# Patient Record
Sex: FEMALE | Race: WHITE | NOT HISPANIC OR LATINO | Employment: FULL TIME | URBAN - METROPOLITAN AREA
[De-identification: names, ages, dates, MRNs, and addresses within clinical notes are randomized per-mention and may not be internally consistent; named-entity substitution may affect disease eponyms.]

---

## 2017-03-01 ENCOUNTER — MYC MEDICAL ADVICE (OUTPATIENT)
Dept: FAMILY MEDICINE | Facility: CLINIC | Age: 32
End: 2017-03-01

## 2017-03-08 ENCOUNTER — OFFICE VISIT (OUTPATIENT)
Dept: FAMILY MEDICINE | Facility: CLINIC | Age: 32
End: 2017-03-08
Payer: COMMERCIAL

## 2017-03-08 VITALS
OXYGEN SATURATION: 99 % | DIASTOLIC BLOOD PRESSURE: 78 MMHG | HEART RATE: 70 BPM | WEIGHT: 192.6 LBS | HEIGHT: 67 IN | BODY MASS INDEX: 30.23 KG/M2 | TEMPERATURE: 98.5 F | SYSTOLIC BLOOD PRESSURE: 120 MMHG

## 2017-03-08 DIAGNOSIS — E73.9 LACTOSE INTOLERANCE: ICD-10-CM

## 2017-03-08 DIAGNOSIS — L70.9 ACNE, UNSPECIFIED ACNE TYPE: ICD-10-CM

## 2017-03-08 DIAGNOSIS — J34.89 SORE NOSE: ICD-10-CM

## 2017-03-08 DIAGNOSIS — R20.0 FINGER NUMBNESS: ICD-10-CM

## 2017-03-08 DIAGNOSIS — R19.5 LOOSE STOOLS: Primary | ICD-10-CM

## 2017-03-08 PROCEDURE — 99214 OFFICE O/P EST MOD 30 MIN: CPT | Performed by: FAMILY MEDICINE

## 2017-03-08 RX ORDER — ETHYNODIOL DIACETATE AND ETHINYL ESTRADIOL 1 MG-35MCG
1 KIT ORAL DAILY
Qty: 84 TABLET | Refills: 3 | Status: SHIPPED | OUTPATIENT
Start: 2017-03-08 | End: 2017-07-31

## 2017-03-08 NOTE — MR AVS SNAPSHOT
"              After Visit Summary   3/8/2017    Beatris Santo    MRN: [id number]           Patient Information     Date Of Birth          1985        Visit Information        Provider Department      3/8/2017 5:20 PM Ana Luisa Bartlett MD [facility]        Today's Diagnoses     Loose stools    -  1    Acne, unspecified acne type        Lactose intolerance        Finger numbness        Sore nose          Care Instructions    Eliminate lactose for at least 2 weeks and monitor symptoms. When you reintroduce lactose, start slowly with introducing hard cheeses and monitor for 3-4 days before reintroducing something else.     Continue using Differin.     Start taking over-the counter probiotic supplement.     Start using humidifier.     Use saline spray and then follow with saline gel, and then follow with antibiotic ointment at bed time.      Take 1 tablet of Kelnor daily.       Lactose Intolerance    Lactose is a simple sugar found in milk and dairy products. Lactose is found in dairy products such as milk, cheese, cottage cheese, cream, sour cream, ice cream, sherbet, milk solids, powdered milk, and whey.  Lactose is digested with the help of an enzyme the body makes called  lactase.\" People with lactose intolerance cannot digest dairy products because their bodies do not make enough lactase. Undigested lactose in the gut cannot be absorbed. This causes nausea, cramping, bloating, gas, diarrhea, and foul-smelling stools. These symptoms occur within 30 minutes to 2 hours after eating. Symptoms may be mild or severe.  Babies are born with the lactose enzyme, which allows them to absorb breast milk. However, lactose intolerance may appear in children as early as 2 to 5 years old. Even if you have been able to eat dairy products all your life, your body may lose the ability to make the lactose enzyme as you get older. Certain races such as Asians,  Americans, Hispanics, and " American Indians are prone to get this problem earlier in life.  Home care  The following guidelines will help you care for yourself at home:    Your body doesn t need dairy products to be healthy. So, if your symptoms are severe, the best solution is to stop eating dairy products.    If your symptoms are milder, you can probably do well consuming smaller amounts of dairy as long as you combine it with nondairy foods. Yogurt with live cultures may be okay to eat because it contains enzymes that digest lactose. Butter, margarine, hard and aged cheeses (cheddar, Swiss) contain less lactose and may be ok to eat. You will have to experiment to learn how much dairy you can tolerate without getting symptoms. It may help to keep a food diary.    There are many lactose-free dairy products including milk, ice cream, and cheeses that allow you to still enjoy dairy products. You may also take a lactase enzyme as a supplement that will help you digest dairy products.    We all need calcium and vitamin D in our diet for healthy bones. If you reduce or eliminate dairy from your diet, you need to replace it with other food sources that contain these nutrients such as kale, broccoli, white beans, green beans, lima beans, salmon, soy beans, tofu, or fortified juices. Or, you can take daily supplements.    Learn to read food labels and watch for prepared foods that are made with products that contain lactose (such as bread, cereal, lunch meats, salad dressings, cake and cookie mix, and coffee creamers).  Other products besides milk and milk products may contain lactose. They may be less obvious, and you need to check the labels carefully. These things may not bother you, but should be considered if you continue to have problems:    Bread and other baked goods    Waffles, pancakes, biscuits, cookies, and mixes to make them    Processed breakfast foods such as doughnuts, frozen waffles and pancakes, toaster pastries, and sweet  rolls    Processed breakfast cereals    Instant potatoes, soups, and breakfast drinks    Potato chips, corn chips, and other processed snacks    Processed meats like bermudez, sausage, hot dogs, and lunch meats    Margarine    Salad dressings    Liquid and powdered milk-based meal replacements    Protein powders and bars    Candies    Nondairy liquid and powdered coffee creamers    Nondairy whipped toppings  Follow-up care  Follow up with your doctor or as advised by our staff.  When to seek medical care  Get prompt medical attention if any of the following occur:    Increasing abdominal pain or swelling    Abdominal pain that moves to the right side of the lower abdomen    Fever of 100.4 F (38 C) or higher, or as directed by your health care provider    Repeated vomiting or diarrhea    0995-0617 [employer]. [address] [place] [address], [place] [address]. All rights reserved. This information is not intended as a substitute for professional medical care. Always follow your healthcare professional's instructions.        Tips for Lactose Intolerance  If you are lactose intolerant, you have a difficult time digesting lactose, a sugar found in milk and other dairy products. Many people are lactose intolerant. Undigested lactose won t hurt you, but it can cause unpleasant symptoms. The good news is that you can get relief. To help reduce symptoms, look for ways to limit the amount of lactose you eat. You can also take a lactase supplement before you eat dairy products.  Finding Your Limit    People with lactose intolerance may think they can t eat or drink any dairy products. This is often not true. Many people with lactose intolerance can eat or drink small amounts of dairy products without symptoms. To find your own limit, keep track of what you eat and drink. Write down when you have symptoms. Learn how much and what kinds of dairy products you can handle.  Tips to Reduce Symptoms    Choose low-lactose or  lactose-free dairy products.    Eat foods with active cultures, such as yogurt. Active cultures make lactose easier to digest.    Eat or drink dairy products with other foods to lessen symptoms.    Substitute fruit juice for some or all of the milk in recipes.    Take lactase enzyme tablets with dairy products to help prevent symptoms.    Avoid eating many high-lactose foods (such as milk, butter, and ice cream) at one time.  Eat Other Calcium-rich Foods  If you eat less dairy, you may be getting less calcium. Ask your doctor about calcium supplements. Also, eat more dairy-free, calcium-rich foods, such as:    Broccoli, lettuce greens, kale, bok gordon (Chinese cabbage), turnip greens    Fish with edible bones (canned salmon or sardines)    Alfalfa sprouts, soy sprouts    Tofu, soybeans, ngo beans, navy beans    Almonds, sesame seeds    Calcium-fortified orange juice, soy drink, rice drink    Oranges  Try Non-dairy Substitutes  Dairy Substitute   Milk, cream Soy drink, rice drink, nondairy creamer   Cheese Tofu (soy) cheese, some aged cheeses   Butter, margarine Milk-free margarine, vegetable oil   Ice cream Fruit sorbet, juice bars      Your body needs vitamin D to use calcium. You can get vitamin D by eating foods that contain vitamin D such as salmon, tuna, or eggs. Also, talk with your doctor about taking a vitamin D supplement.    1121-0512 [employer]. [address] [place] [address]. All rights reserved. This information is not intended as a substitute for professional medical care. Always follow your healthcare professional's instructions.              Follow-ups after your visit        Who to contact     If you have questions or need follow up information about today's clinic visit or your schedule please contact [facility] directly at [phone].  Normal or non-critical lab and imaging results will be communicated to you by MyChart, letter or phone within 4  "business days after the clinic has received the results. If you do not hear from us within 7 days, please contact the clinic through [employer] or phone. If you have a critical or abnormal lab result, we will notify you by phone as soon as possible.  Submit refill requests through [employer] or call your pharmacy and they will forward the refill request to us. Please allow 3 business days for your refill to be completed.          Additional Information About Your Visit        [employer] Information     [employer] gives you secure access to your electronic health record. If you see a primary care provider, you can also send messages to your care team and make appointments. If you have questions, please call your primary care clinic.  If you do not have a primary care provider, please call [phone] and they will assist you.        Care EveryWhere ID     This is your Care EveryWhere ID. This could be used by other organizations to access your [place] medical records  [id number]        Your Vitals Were     Pulse Temperature Height Last Period Pulse Oximetry BMI (Body Mass Index)    70 98.5  F (36.9  C) (Oral) 1.695 m (5' 6.73\") 02/25/2017 (Exact Date) 99% 30.41 kg/m2       Blood Pressure from Last 3 Encounters:   03/08/17 120/78   06/21/16 130/82    Weight from Last 3 Encounters:   03/08/17 87.4 kg (192 lb 9.6 oz)   06/21/16 79.7 kg (175 lb 11.2 oz)              Today, you had the following     No orders found for display         Today's Medication Changes          These changes are accurate as of: 3/8/17  6:22 PM.  If you have any questions, ask your nurse or doctor.               Start taking these medicines.        Dose/Directions    ethynodiol-ethinyl estradiol 1-35 MG-MCG per tablet   Commonly known as:  KELNOR   Used for:  Acne, unspecified acne type   Started by:  Ana Luisa Bartlett MD        Dose:  1 tablet   Take 1 tablet by mouth daily   Quantity:  84 tablet   Refills:  3            Where to get your " medicines      These medications were sent to [facility]/pharmacy #[id number] - [facility] [address], [place] AT [facility]  [address] [facility] [address] [place] [address]     Phone:  [phone]     ethynodiol-ethinyl estradiol 1-35 MG-MCG per tablet                Primary Care Provider Office Phone #    [facility] [phone]       No address on file        Thank you!     Thank you for choosing [facility]  for your care. Our goal is always to provide you with excellent care. Hearing back from our patients is one way we can continue to improve our services. Please take a few minutes to complete the written survey that you may receive in the mail after your visit with us. Thank you!             Your Updated Medication List - Protect others around you: Learn how to safely use, store and throw away your medicines at www.disposemymeds.org.          This list is accurate as of: 3/8/17  6:22 PM.  Always use your most recent med list.                   Brand Name Dispense Instructions for use    adapalene 0.1 % cream    DIFFERIN    45 g    Apply to clean face at bedtime       ethynodiol-ethinyl estradiol 1-35 MG-MCG per tablet    KELNOR    84 tablet    Take 1 tablet by mouth daily

## 2017-03-08 NOTE — PROGRESS NOTES
SUBJECTIVE:                                                    Beatris Santo is a 31 year old female who presents to clinic today for the following health issues:    Medication Followup of Adapalene    Taking Medication as prescribed: yes    Side Effects:  None    Medication Helping Symptoms:  NO- would like to discuss other options     ABDOMINAL PAIN     Onset:     Description:   Character: Cramping, bloating  Location: right lower quadrant left lower quadrant  Radiation: None    Intensity: mild    Progression of Symptoms:  same and intermittent    Accompanying Signs & Symptoms:  Fever/Chills?: no   Gas/Bloating: YES  Nausea: no   Vomitting: no   Diarrhea?: YES  Constipation:no   Dysuria or Hematuria: no    History:   Trauma: no   Previous similar pain: no    Previous tests done: none    Precipitating factors:   Does the pain change with:     Food: YES     BM: YES    Urination: no     Therapies Tried and outcome: lactaid- some relief     LMP:  2/25/2017     Concern: left pinky numbs and aches when typing for a long time. Cramping 1/10.        Problem list and histories reviewed & adjusted, as indicated.  Additional history: as documented      Acne: Beatris reports she does not believe Differin is working as good as she hoped. It has helped somewhat. She is using Cetaphil acne wash. She believes acne is largely hormonal and she usually breaks out around the time of her period and she would like to discuss OCP. Notes she picks at her acne. She is not currently sexually active.     Abdominal Pain: Beatris reports she is slightly lactose intolerant but is not sure to what extent. After she eats, at times, she is feeling gassy, bloated and has diarrhea. Stool is runny and loose. Dairy will worsen sx's but they are present even when she eats food that does not contain dairy (such as eating out for Chinese). She admits to sill eating a fair amt of cheese and yogart. She is drinking proteins containing whey  powder. Notes chinese food might be a trigger. Bowel movements are normal between episodes.  She is not waking up with diarrhea or abdominal pain. Sx's are not present at any specific time of the month. Denies nausea, fever, weight loss, anorexia, gerd    Finger pain: She reports her fifth finger will feel numb when she types too much at work, and it will feel disconnected from her hand. Denies elbow pain,joint pain. -she is not resting elbow on desk.    Additional Notes  -She notes that she went to [place] Nov. 2016 and she experienced epistaxis. Nasal dryness and epistaxis has persisted and she has developed scabs inside her nose. She has visited [facility] for this and is using an aloe saline spray/gel-pt is unsure of exact name.       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       Social History   Substance Use Topics     Smoking status: Never Smoker     Smokeless tobacco: Not on file     Alcohol use 0.0 oz/week      Comment: Socially     Family History   Problem Relation Age of Onset     DIABETES Mother      DIABETES Maternal Grandmother      Coronary Artery Disease Other      Great Grandparents (Maternal and Paternal)     Hypertension Maternal Grandmother      Hypertension Maternal Grandfather      Hypertension Paternal Grandmother      Hypertension Paternal Grandfather      Hyperlipidemia Father      Hyperlipidemia Other      Great Grandparents (Paternal)     Breast Cancer Maternal Grandmother      Colon Cancer Maternal Grandfather 86     Asthma Mother      Asthma Maternal Grandmother          Current Outpatient Prescriptions   Medication Sig Dispense Refill     adapalene (DIFFERIN) 0.1 % cream Apply to clean face at bedtime 45 g 11     Allergies   Allergen Reactions     Dairy Aid [Lactase]        Reviewed and updated as needed this visit by clinical staff      "  Reviewed and updated as needed this visit by Provider         ROS:  Constitutional, HEENT, cardiovascular, pulmonary, gi and gu systems are negative, except as otherwise noted.    OBJECTIVE:                                                    /78 (BP Location: Right arm, Patient Position: Chair, Cuff Size: Adult Regular)  Pulse 70  Temp 98.5  F (36.9  C) (Oral)  Ht 1.695 m (5' 6.73\")  Wt 87.4 kg (192 lb 9.6 oz)  LMP 02/25/2017 (Exact Date)  SpO2 99%  BMI 30.41 kg/m2  Body mass index is 30.41 kg/(m^2).  GENERAL: healthy, alert and no distress  HENT: nose and mouth without ulcers or lesions  ABDOMEN: soft, nontender, no hepatosplenomegaly, no masses and bowel sounds normal  MS: no gross musculoskeletal defects noted, no edema. Mild left ulnar groove tenderness-elbow.   PSYCH: mentation appears normal, affect normal/bright    Diagnostic Test Results:  Results for orders placed or performed in visit on 06/23/16   HIV Antigen Antibody Combo   Result Value Ref Range    HIV Antigen Antibody Combo  NR     Nonreactive   HIV-1 p24 Ag & HIV-1/HIV-2 Ab Not Detected     Hepatitis C antibody   Result Value Ref Range    Hepatitis C Antibody  NR     Nonreactive   Assay performance characteristics have not been established for newborns,   infants, and children     Anti Treponema   Result Value Ref Range    Treponema pallidum Antibody Negative NEG   Lipid panel reflex to direct LDL   Result Value Ref Range    Cholesterol 176 <200 mg/dL    Triglycerides 90 <150 mg/dL    HDL Cholesterol 49 (L) >49 mg/dL    LDL Cholesterol Calculated 109 (H) <100 mg/dL    Non HDL Cholesterol 127 <130 mg/dL   TSH with free T4 reflex   Result Value Ref Range    TSH 1.16 0.40 - 4.00 mU/L   Glucose   Result Value Ref Range    Glucose 82 70 - 99 mg/dL   IgA   Result Value Ref Range     70 - 380 mg/dL   Tissue transglutaminase antoinette IgA and IgG   Result Value Ref Range    Tissue Transglutaminase Antibody IgA <1  Negative   <7 U/mL    Tissue " "Transglutaminase Delia IgG 1 <7 U/mL   Ferritin   Result Value Ref Range    Ferritin 23 12 - 150 ng/mL          ASSESSMENT/PLAN:                                                      1. Loose stools  2. Lactose intolerance  Reviewed eliminating lactose for 2 weeks to see if sx's resolve. If sx's persistent with this will need further GI w/u and will give referral. However, if sx's resolve can slowly add back in lactose to determine which foods are most problematic.     3. Acne, unspecified acne type  Continue with Differin and will start Kelnor daily. Reviewed risks and benefits, typical use and advised pt that it might take a period of time before she start seeing benefit.   - ethynodiol-ethinyl estradiol (KELNOR) 1-35 MG-MCG per tablet; Take 1 tablet by mouth daily  Dispense: 84 tablet; Refill: 3    4. Finger numbness  Suspect due to ulnar neuropathy. Reviewed monitoring elbow positioning when sitting for mgmt. F/U if persistent.     5. Sore nose  Reviewed symptomatic mgmt including saline rinse, gel, abx ointment, and humidifier. F/ U PRN.        See Patient Instructions  Patient Instructions     Eliminate lactose for at least 2 weeks and monitor symptoms. When you reintroduce lactose, start slowly with introducing hard cheeses and monitor for 3-4 days before reintroducing something else.     Continue using Differin.     Start taking over-the counter probiotic supplement.     Start using humidifier.     Use saline spray and then follow with saline gel, and then follow with antibiotic ointment at bed time.      Take 1 tablet of Kelnor daily.       Lactose Intolerance    Lactose is a simple sugar found in milk and dairy products. Lactose is found in dairy products such as milk, cheese, cottage cheese, cream, sour cream, ice cream, sherbet, milk solids, powdered milk, and whey.  Lactose is digested with the help of an enzyme the body makes called  lactase.\" People with lactose intolerance cannot digest dairy products " because their bodies do not make enough lactase. Undigested lactose in the gut cannot be absorbed. This causes nausea, cramping, bloating, gas, diarrhea, and foul-smelling stools. These symptoms occur within 30 minutes to 2 hours after eating. Symptoms may be mild or severe.  Babies are born with the lactose enzyme, which allows them to absorb breast milk. However, lactose intolerance may appear in children as early as 2 to 5 years old. Even if you have been able to eat dairy products all your life, your body may lose the ability to make the lactose enzyme as you get older. Certain races such as Asians,  Americans, Hispanics, and American Indians are prone to get this problem earlier in life.  Home care  The following guidelines will help you care for yourself at home:    Your body doesn t need dairy products to be healthy. So, if your symptoms are severe, the best solution is to stop eating dairy products.    If your symptoms are milder, you can probably do well consuming smaller amounts of dairy as long as you combine it with nondairy foods. Yogurt with live cultures may be okay to eat because it contains enzymes that digest lactose. Butter, margarine, hard and aged cheeses (cheddar, Swiss) contain less lactose and may be ok to eat. You will have to experiment to learn how much dairy you can tolerate without getting symptoms. It may help to keep a food diary.    There are many lactose-free dairy products including milk, ice cream, and cheeses that allow you to still enjoy dairy products. You may also take a lactase enzyme as a supplement that will help you digest dairy products.    We all need calcium and vitamin D in our diet for healthy bones. If you reduce or eliminate dairy from your diet, you need to replace it with other food sources that contain these nutrients such as kale, broccoli, white beans, green beans, lima beans, salmon, soy beans, tofu, or fortified juices. Or, you can take daily  supplements.    Learn to read food labels and watch for prepared foods that are made with products that contain lactose (such as bread, cereal, lunch meats, salad dressings, cake and cookie mix, and coffee creamers).  Other products besides milk and milk products may contain lactose. They may be less obvious, and you need to check the labels carefully. These things may not bother you, but should be considered if you continue to have problems:    Bread and other baked goods    Waffles, pancakes, biscuits, cookies, and mixes to make them    Processed breakfast foods such as doughnuts, frozen waffles and pancakes, toaster pastries, and sweet rolls    Processed breakfast cereals    Instant potatoes, soups, and breakfast drinks    Potato chips, corn chips, and other processed snacks    Processed meats like bermudez, sausage, hot dogs, and lunch meats    Margarine    Salad dressings    Liquid and powdered milk-based meal replacements    Protein powders and bars    Candies    Nondairy liquid and powdered coffee creamers    Nondairy whipped toppings  Follow-up care  Follow up with your doctor or as advised by our staff.  When to seek medical care  Get prompt medical attention if any of the following occur:    Increasing abdominal pain or swelling    Abdominal pain that moves to the right side of the lower abdomen    Fever of 100.4 F (38 C) or higher, or as directed by your health care provider    Repeated vomiting or diarrhea    6044-9366 [employer]. [address] [place] [address]. All rights reserved. This information is not intended as a substitute for professional medical care. Always follow your healthcare professional's instructions.        Tips for Lactose Intolerance  If you are lactose intolerant, you have a difficult time digesting lactose, a sugar found in milk and other dairy products. Many people are lactose intolerant. Undigested lactose won t hurt you, but it can cause unpleasant  symptoms. The good news is that you can get relief. To help reduce symptoms, look for ways to limit the amount of lactose you eat. You can also take a lactase supplement before you eat dairy products.  Finding Your Limit    People with lactose intolerance may think they can t eat or drink any dairy products. This is often not true. Many people with lactose intolerance can eat or drink small amounts of dairy products without symptoms. To find your own limit, keep track of what you eat and drink. Write down when you have symptoms. Learn how much and what kinds of dairy products you can handle.  Tips to Reduce Symptoms    Choose low-lactose or lactose-free dairy products.    Eat foods with active cultures, such as yogurt. Active cultures make lactose easier to digest.    Eat or drink dairy products with other foods to lessen symptoms.    Substitute fruit juice for some or all of the milk in recipes.    Take lactase enzyme tablets with dairy products to help prevent symptoms.    Avoid eating many high-lactose foods (such as milk, butter, and ice cream) at one time.  Eat Other Calcium-rich Foods  If you eat less dairy, you may be getting less calcium. Ask your doctor about calcium supplements. Also, eat more dairy-free, calcium-rich foods, such as:    Broccoli, lettuce greens, kale, bok gordon (Chinese cabbage), turnip greens    Fish with edible bones (canned salmon or sardines)    Alfalfa sprouts, soy sprouts    Tofu, soybeans, ngo beans, navy beans    Almonds, sesame seeds    Calcium-fortified orange juice, soy drink, rice drink    Oranges  Try Non-dairy Substitutes  Dairy Substitute   Milk, cream Soy drink, rice drink, nondairy creamer   Cheese Tofu (soy) cheese, some aged cheeses   Butter, margarine Milk-free margarine, vegetable oil   Ice cream Fruit sorbet, juice bars      Your body needs vitamin D to use calcium. You can get vitamin D by eating foods that contain vitamin D such as salmon, tuna, or eggs. Also, talk  with your doctor about taking a vitamin D supplement.    6817-5959 The [employer]. [address] [place] [address], [place] [address]. All rights reserved. This information is not intended as a substitute for professional medical care. Always follow your healthcare professional's instructions.            This document serves as a record of the services and decisions personally performed and made by Ana Luisa Bartlett MD. It was created on her behalf by Mary García,a trained medical scribe. The creation of this document is based the provider's statements to the medical scribe.  Mary García March 8, 2017 6:28 PM     The information in this document, created by a scribe for me, accurately reflects the services I personally performed and the decisions made by me. I have reviewed and approved this document for accuracy.    MD Ana Luisa Cartwright MD  [facility]

## 2017-03-09 PROBLEM — E73.9 LACTOSE INTOLERANCE: Status: ACTIVE | Noted: 2017-03-09

## 2017-03-09 NOTE — PATIENT INSTRUCTIONS
"Eliminate lactose for at least 2 weeks and monitor symptoms. When you reintroduce lactose, start slowly with introducing hard cheeses and monitor for 3-4 days before reintroducing something else.     Continue using Differin.     Start taking over-the counter probiotic supplement.     Start using humidifier.     Use saline spray and then follow with saline gel, and then follow with antibiotic ointment at bed time.      Take 1 tablet of Kelnor daily.       Lactose Intolerance    Lactose is a simple sugar found in milk and dairy products. Lactose is found in dairy products such as milk, cheese, cottage cheese, cream, sour cream, ice cream, sherbet, milk solids, powdered milk, and whey.  Lactose is digested with the help of an enzyme the body makes called  lactase.\" People with lactose intolerance cannot digest dairy products because their bodies do not make enough lactase. Undigested lactose in the gut cannot be absorbed. This causes nausea, cramping, bloating, gas, diarrhea, and foul-smelling stools. These symptoms occur within 30 minutes to 2 hours after eating. Symptoms may be mild or severe.  Babies are born with the lactose enzyme, which allows them to absorb breast milk. However, lactose intolerance may appear in children as early as 2 to 5 years old. Even if you have been able to eat dairy products all your life, your body may lose the ability to make the lactose enzyme as you get older. Certain races such as Asians,  Americans, Hispanics, and American Indians are prone to get this problem earlier in life.  Home care  The following guidelines will help you care for yourself at home:    Your body doesn t need dairy products to be healthy. So, if your symptoms are severe, the best solution is to stop eating dairy products.    If your symptoms are milder, you can probably do well consuming smaller amounts of dairy as long as you combine it with nondairy foods. Yogurt with live cultures may be okay to eat " because it contains enzymes that digest lactose. Butter, margarine, hard and aged cheeses (cheddar, Swiss) contain less lactose and may be ok to eat. You will have to experiment to learn how much dairy you can tolerate without getting symptoms. It may help to keep a food diary.    There are many lactose-free dairy products including milk, ice cream, and cheeses that allow you to still enjoy dairy products. You may also take a lactase enzyme as a supplement that will help you digest dairy products.    We all need calcium and vitamin D in our diet for healthy bones. If you reduce or eliminate dairy from your diet, you need to replace it with other food sources that contain these nutrients such as kale, broccoli, white beans, green beans, lima beans, salmon, soy beans, tofu, or fortified juices. Or, you can take daily supplements.    Learn to read food labels and watch for prepared foods that are made with products that contain lactose (such as bread, cereal, lunch meats, salad dressings, cake and cookie mix, and coffee creamers).  Other products besides milk and milk products may contain lactose. They may be less obvious, and you need to check the labels carefully. These things may not bother you, but should be considered if you continue to have problems:    Bread and other baked goods    Waffles, pancakes, biscuits, cookies, and mixes to make them    Processed breakfast foods such as doughnuts, frozen waffles and pancakes, toaster pastries, and sweet rolls    Processed breakfast cereals    Instant potatoes, soups, and breakfast drinks    Potato chips, corn chips, and other processed snacks    Processed meats like bermudez, sausage, hot dogs, and lunch meats    Margarine    Salad dressings    Liquid and powdered milk-based meal replacements    Protein powders and bars    Candies    Nondairy liquid and powdered coffee creamers    Nondairy whipped toppings  Follow-up care  Follow up with your doctor or as advised by our  staff.  When to seek medical care  Get prompt medical attention if any of the following occur:    Increasing abdominal pain or swelling    Abdominal pain that moves to the right side of the lower abdomen    Fever of 100.4 F (38 C) or higher, or as directed by your health care provider    Repeated vomiting or diarrhea    6507-1045 [employer]. [address] [place] [address], [place] [address]. All rights reserved. This information is not intended as a substitute for professional medical care. Always follow your healthcare professional's instructions.        Tips for Lactose Intolerance  If you are lactose intolerant, you have a difficult time digesting lactose, a sugar found in milk and other dairy products. Many people are lactose intolerant. Undigested lactose won t hurt you, but it can cause unpleasant symptoms. The good news is that you can get relief. To help reduce symptoms, look for ways to limit the amount of lactose you eat. You can also take a lactase supplement before you eat dairy products.  Finding Your Limit    People with lactose intolerance may think they can t eat or drink any dairy products. This is often not true. Many people with lactose intolerance can eat or drink small amounts of dairy products without symptoms. To find your own limit, keep track of what you eat and drink. Write down when you have symptoms. Learn how much and what kinds of dairy products you can handle.  Tips to Reduce Symptoms    Choose low-lactose or lactose-free dairy products.    Eat foods with active cultures, such as yogurt. Active cultures make lactose easier to digest.    Eat or drink dairy products with other foods to lessen symptoms.    Substitute fruit juice for some or all of the milk in recipes.    Take lactase enzyme tablets with dairy products to help prevent symptoms.    Avoid eating many high-lactose foods (such as milk, butter, and ice cream) at one time.  Eat Other Calcium-rich Foods  If you eat less  dairy, you may be getting less calcium. Ask your doctor about calcium supplements. Also, eat more dairy-free, calcium-rich foods, such as:    Broccoli, lettuce greens, kale, bok gordon (Chinese cabbage), turnip greens    Fish with edible bones (canned salmon or sardines)    Alfalfa sprouts, soy sprouts    Tofu, soybeans, ngo beans, navy beans    Almonds, sesame seeds    Calcium-fortified orange juice, soy drink, rice drink    Oranges  Try Non-dairy Substitutes  Dairy Substitute   Milk, cream Soy drink, rice drink, nondairy creamer   Cheese Tofu (soy) cheese, some aged cheeses   Butter, margarine Milk-free margarine, vegetable oil   Ice cream Fruit sorbet, juice bars      Your body needs vitamin D to use calcium. You can get vitamin D by eating foods that contain vitamin D such as salmon, tuna, or eggs. Also, talk with your doctor about taking a vitamin D supplement.    0504-5595 [employer]. [address] [place] [address], [place] [address]. All rights reserved. This information is not intended as a substitute for professional medical care. Always follow your healthcare professional's instructions.

## 2017-07-31 ENCOUNTER — OFFICE VISIT (OUTPATIENT)
Dept: FAMILY MEDICINE | Facility: CLINIC | Age: 32
End: 2017-07-31
Payer: COMMERCIAL

## 2017-07-31 VITALS
WEIGHT: 198.2 LBS | HEART RATE: 62 BPM | RESPIRATION RATE: 16 BRPM | OXYGEN SATURATION: 99 % | SYSTOLIC BLOOD PRESSURE: 124 MMHG | TEMPERATURE: 98 F | BODY MASS INDEX: 31.11 KG/M2 | HEIGHT: 67 IN | DIASTOLIC BLOOD PRESSURE: 76 MMHG

## 2017-07-31 DIAGNOSIS — E73.9 LACTOSE INTOLERANCE: ICD-10-CM

## 2017-07-31 DIAGNOSIS — E66.9 OBESITY, UNSPECIFIED OBESITY SEVERITY, UNSPECIFIED OBESITY TYPE: ICD-10-CM

## 2017-07-31 DIAGNOSIS — L70.9 ACNE, UNSPECIFIED ACNE TYPE: ICD-10-CM

## 2017-07-31 DIAGNOSIS — Z00.00 ENCOUNTER FOR ROUTINE ADULT HEALTH EXAMINATION WITHOUT ABNORMAL FINDINGS: Primary | ICD-10-CM

## 2017-07-31 LAB
CHOLEST SERPL-MCNC: 222 MG/DL
GLUCOSE SERPL-MCNC: 93 MG/DL (ref 70–99)
HDLC SERPL-MCNC: 62 MG/DL
LDLC SERPL CALC-MCNC: 140 MG/DL
NONHDLC SERPL-MCNC: 160 MG/DL
TRIGL SERPL-MCNC: 99 MG/DL

## 2017-07-31 PROCEDURE — 36415 COLL VENOUS BLD VENIPUNCTURE: CPT | Performed by: FAMILY MEDICINE

## 2017-07-31 PROCEDURE — 84443 ASSAY THYROID STIM HORMONE: CPT | Performed by: FAMILY MEDICINE

## 2017-07-31 PROCEDURE — 80061 LIPID PANEL: CPT | Performed by: FAMILY MEDICINE

## 2017-07-31 PROCEDURE — 99395 PREV VISIT EST AGE 18-39: CPT | Performed by: FAMILY MEDICINE

## 2017-07-31 PROCEDURE — 82947 ASSAY GLUCOSE BLOOD QUANT: CPT | Performed by: FAMILY MEDICINE

## 2017-07-31 RX ORDER — DROSPIRENONE AND ETHINYL ESTRADIOL 0.03MG-3MG
1 KIT ORAL DAILY
Qty: 84 TABLET | Refills: 3 | Status: SHIPPED | OUTPATIENT
Start: 2017-07-31 | End: 2018-06-22

## 2017-07-31 ASSESSMENT — PAIN SCALES - GENERAL: PAINLEVEL: MILD PAIN (3)

## 2017-07-31 NOTE — NURSING NOTE
"Chief Complaint   Patient presents with     Physical     pt is fasting       Initial /76 (BP Location: Right arm, Patient Position: Right side, Cuff Size: Adult Regular)  Pulse 62  Temp 98  F (36.7  C) (Oral)  Resp 16  Ht 1.695 m (5' 6.75\")  Wt 89.9 kg (198 lb 3.2 oz)  LMP 06/20/2017 (Approximate)  SpO2 99%  BMI 31.28 kg/m2 Estimated body mass index is 31.28 kg/(m^2) as calculated from the following:    Height as of this encounter: 1.695 m (5' 6.75\").    Weight as of this encounter: 89.9 kg (198 lb 3.2 oz).  Medication Reconciliation: complete     Will Soledad LYNCH      "

## 2017-07-31 NOTE — MR AVS SNAPSHOT
After Visit Summary   7/31/2017    Beatris Santo    MRN: [id number]           Patient Information     Date Of Birth          1985        Visit Information        Provider Department      7/31/2017 7:00 AM Ana Luisa Bartlett MD [facility]        Today's Diagnoses     Encounter for routine adult health examination without abnormal findings    -  1    Acne, unspecified acne type        Lactose intolerance        Obesity, unspecified obesity severity, unspecified obesity type          Care Instructions    Finish this months pack of birth control pills then start with the new pack and skip placebo pills for that month.    Try using Aquaphor on the edge of your nose      Preventive Health Recommendations  Female Ages 26 - 39  Yearly exam:   See your health care provider every year in order to    Review health changes.     Discuss preventive care.      Review your medicines if you your doctor has prescribed any.    Until age 30: Get a Pap test every three years (more often if you have had an abnormal result).    After age 30: Talk to your doctor about whether you should have a Pap test every 3 years or have a Pap test with HPV screening every 5 years.   You do not need a Pap test if your uterus was removed (hysterectomy) and you have not had cancer.  You should be tested each year for STDs (sexually transmitted diseases), if you're at risk.   Talk to your provider about how often to have your cholesterol checked.  If you are at risk for diabetes, you should have a diabetes test (fasting glucose).  Shots: Get a flu shot each year. Get a tetanus shot every 10 years.   Nutrition:     Eat at least 5 servings of fruits and vegetables each day.    Eat whole-grain bread, whole-wheat pasta and brown rice instead of white grains and rice.    Talk to your provider about Calcium and Vitamin D.     Lifestyle    Exercise at least 150 minutes a week (30 minutes a day, 5 days of the  "week). This will help you control your weight and prevent disease.    Limit alcohol to one drink per day.    No smoking.     Wear sunscreen to prevent skin cancer.    See your dentist every six months for an exam and cleaning.            Follow-ups after your visit        Future tests that were ordered for you today     Open Future Orders        Priority Expected Expires Ordered    TSH with free T4 reflex Routine  1/31/2018 7/31/2017            Who to contact     If you have questions or need follow up information about today's clinic visit or your schedule please contact [facility] directly at [phone].  Normal or non-critical lab and imaging results will be communicated to you by Impervahart, letter or phone within 4 business days after the clinic has received the results. If you do not hear from us within 7 days, please contact the clinic through JRD Communicationt or phone. If you have a critical or abnormal lab result, we will notify you by phone as soon as possible.  Submit refill requests through Vupen or call your pharmacy and they will forward the refill request to us. Please allow 3 business days for your refill to be completed.          Additional Information About Your Visit        MyChart Information     Vupen gives you secure access to your electronic health record. If you see a primary care provider, you can also send messages to your care team and make appointments. If you have questions, please call your primary care clinic.  If you do not have a primary care provider, please call [phone] and they will assist you.        Care EveryWhere ID     This is your Care EveryWhere ID. This could be used by other organizations to access your [facility] medical records  [id number]        Your Vitals Were     Pulse Temperature Respirations Height Last Period Pulse Oximetry    62 98  F (36.7  C) (Oral) 16 1.695 m (5' 6.75\") 06/20/2017 (Approximate) 99%    BMI (Body Mass Index)                   " 31.28 kg/m2            Blood Pressure from Last 3 Encounters:   07/31/17 124/76   03/08/17 120/78   06/21/16 130/82    Weight from Last 3 Encounters:   07/31/17 89.9 kg (198 lb 3.2 oz)   03/08/17 87.4 kg (192 lb 9.6 oz)   06/21/16 79.7 kg (175 lb 11.2 oz)              We Performed the Following     Glucose     Lipid panel reflex to direct LDL          Today's Medication Changes          These changes are accurate as of: 7/31/17  7:39 AM.  If you have any questions, ask your nurse or doctor.               Start taking these medicines.        Dose/Directions    drospirenone-ethinyl estradiol 3-0.03 MG per tablet   Commonly known as:  MAYTE   Used for:  Acne, unspecified acne type   Started by:  Ana Luisa Bartlett MD        Dose:  1 tablet   Take 1 tablet by mouth daily   Quantity:  84 tablet   Refills:  3         Stop taking these medicines if you haven't already. Please contact your care team if you have questions.     ethynodiol-ethinyl estradiol 1-35 MG-MCG per tablet   Commonly known as:  KELNOR   Stopped by:  Ana Luisa Bartlett MD                Where to get your medicines      These medications were sent to [facility]/pharmacy #[id number] - [facility] [address] AT [place] [id number]     Phone:  [phone]     drospirenone-ethinyl estradiol 3-0.03 MG per tablet                Primary Care Provider Office Phone # Fax #    Ana Luisa Bartlett [phone]       [facility] [address] [address]        Equal Access to Services     SHREE MICHELLE AH: Dacia Singh, vance badillo, eladio kaalmagisselle dean, margarita mike. So [facility] [phone].    ATENCIÓN: Si habla español, tiene a romero disposición servicios gratuitos de asistencia lingüística. Llame al [phone].    We comply with applicable federal civil rights laws and [place] laws. We  do not discriminate on the basis of race, color, national origin, age, disability sex, sexual orientation or gender identity.            Thank you!     Thank you for choosing [facility]  for your care. Our goal is always to provide you with excellent care. Hearing back from our patients is one way we can continue to improve our services. Please take a few minutes to complete the written survey that you may receive in the mail after your visit with us. Thank you!             Your Updated Medication List - Protect others around you: Learn how to safely use, store and throw away your medicines at www.disposemymeds.org.          This list is accurate as of: 7/31/17  7:39 AM.  Always use your most recent med list.                   Brand Name Dispense Instructions for use Diagnosis    adapalene 0.1 % cream    DIFFERIN    45 g    Apply to clean face at bedtime    Acne, unspecified acne type       drospirenone-ethinyl estradiol 3-0.03 MG per tablet    MAYTE    84 tablet    Take 1 tablet by mouth daily    Acne, unspecified acne type

## 2017-07-31 NOTE — PROGRESS NOTES
"Answers for HPI/ROS submitted by the patient on 7/30/2017   Annual Exam:  Getting at least 3 servings of Calcium per day:: NO  Bi-annual eye exam:: Yes  Dental care twice a year:: Yes  Sleep apnea or symptoms of sleep apnea:: None  Diet:: Regular (no restrictions)  Frequency of exercise:: 2-3 days/week  Taking medications regularly:: Yes  Medication side effects:: Not applicable  Additional concerns today:: No  PHQ-2 Score: 0  Duration of exercise:: 15-30 minutes    Today's PHQ-2 Score:   PHQ-2 ( 1999 [employer]) 7/31/2017 7/30/2017   Q1: Little interest or pleasure in doing things 0 0   Q2: Feeling down, depressed or hopeless 0 0   PHQ-2 Score 0 0   Q1: Little interest or pleasure in doing things - Not at all   Q2: Feeling down, depressed or hopeless - Not at all   PHQ-2 Score - 0       Past/recent records reviewed and discussed for --  Reviewed Family, surgical, and past medical Hx.  Discussed current medications and reviewed health maintenance.   Pt is not interested in STD screening and has not had any new partners     BC -- Birth control is only okay; she doesn't think it is helping with acne. She is still getting large acne breakouts before her period and feels as though she has gained weight. She started the pill in March- only for acne, not for birth control. This is the first pill she has tried.  Acne flares are during the third week- one week prior to period.     Weight -- Pt says she has been very busy and hasn't had time to work out. There is a program at her work focusing on healthy diet and exercise called \"lose it to win it\" that she will be starting this month.   Wt Readings from Last 5 Encounters:   07/31/17 89.9 kg (198 lb 3.2 oz)   03/08/17 87.4 kg (192 lb 9.6 oz)   06/21/16 79.7 kg (175 lb 11.2 oz)     Loose stools/Lactose intolerance -- She is keeping lact-aid pills close by and has been trying to avoid lactose in foods as this made big difference for her. . No current concerns.         Abuse: " Current or Past(Physical, Sexual or Emotional)- No  Do you feel safe in your environment - Yes  Social History   Substance Use Topics     Smoking status: Never Smoker     Smokeless tobacco: Never Used     Alcohol use 0.0 oz/week      Comment: Socially - 0.5 per week     The patient does not drink >3 drinks per day nor >7 drinks per week.    Reviewed orders with patient.  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       Social History   Substance Use Topics     Smoking status: Never Smoker     Smokeless tobacco: Never Used     Alcohol use 0.0 oz/week      Comment: Socially - 0.5 per week     Family History   Problem Relation Age of Onset     DIABETES Mother      Asthma Mother      Hyperlipidemia Father      DIABETES Maternal Grandmother      Hypertension Maternal Grandmother      Breast Cancer Maternal Grandmother      Asthma Maternal Grandmother      Hypertension Maternal Grandfather      Colon Cancer Maternal Grandfather 86     Hypertension Paternal Grandmother      Hypertension Paternal Grandfather      Coronary Artery Disease Other      Great Grandparents (Maternal and Paternal)     Hyperlipidemia Other      Great Grandparents (Paternal)               Mammogram not appropriate for this patient based on age.    Pertinent mammograms are reviewed under the imaging tab.  History of abnormal Pap smear: NO - age 30- 65 PAP every 3 years  "recommended    Reviewed and updated as needed this visit by clinical staffTobacco  Allergies  Meds  Med Hx  Surg Hx  Fam Hx  Soc Hx        Reviewed and updated as needed this visit by Provider            ROS:   ROS: 10 point ROS neg other than the symptoms noted above in the HPI.    This document serves as a record of the services and decisions personally performed and made by Ana Luisa Bartlett MD. It was created on her behalf by Sherron Castro, a trained medical scribe. The creation of this document is based the provider's statements to the medical scribe.  Sherron Castro July 31, 2017 7:16 AM      OBJECTIVE:   /76 (BP Location: Right arm, Patient Position: Right side, Cuff Size: Adult Regular)  Pulse 62  Temp 98  F (36.7  C) (Oral)  Resp 16  Ht 1.695 m (5' 6.75\")  Wt 89.9 kg (198 lb 3.2 oz)  LMP 06/20/2017 (Approximate)  SpO2 99%  BMI 31.28 kg/m2  EXAM:  GENERAL: healthy, alert and no distress, obese  EYES: Eyes grossly normal to inspection, PERRL and conjunctivae and sclerae normal  HENT: ear canals and TM's normal, nose and mouth without ulcers or lesions, nose with dry, cracking skin  NECK: no adenopathy, no asymmetry, masses, or scars and thyroid normal to palpation  RESP: lungs clear to auscultation - no rales, rhonchi or wheezes  BREAST: normal without masses, tenderness or nipple discharge and no palpable axillary masses or adenopathy  CV: regular rate and rhythm, normal S1 S2, no S3 or S4, no murmur, click or rub, no peripheral edema and peripheral pulses strong  ABDOMEN: soft, nontender, no hepatosplenomegaly, no masses and bowel sounds normal   (female): normal female external genitalia, normal urethral meatus, vaginal mucosa pink, moist, well rugated, and normal cervix/adnexa/uterus without masses, moderate amount of discharge  MS: no gross musculoskeletal defects noted, no edema  SKIN: no suspicious lesions or rashes  NEURO: Normal strength and tone, mentation intact and " "speech normal  PSYCH: mentation appears normal, affect normal/bright    No results found for this or any previous visit (from the past 24 hour(s)).    ASSESSMENT/PLAN:   1. Encounter for routine adult health examination without abnormal findings  - Lipid panel reflex to direct LDL  - Glucose    2. Acne, unspecified acne type  Change ocp to mayte, consider skipping placebo pills to cycle Q 3-4 months also. ommon side effects of ocp's are reviewed as well as risks of blood clot and htn. Reviewed proper daily use and plan for missed pills. Reviewed back up contraception with condoms.   - drospirenone-ethinyl estradiol (MAYTE) 3-0.03 MG per tablet; Take 1 tablet by mouth daily  Dispense: 84 tablet; Refill: 3  - TSH with free T4 reflex; Future    3. Lactose intolerance  Improved stooling. Labs completed today.  - TSH with free T4 reflex; Future    4. Obesity, unspecified obesity severity, unspecified obesity type  Discussed healthy diet and exercise.   - TSH with free T4 reflex; Future    COUNSELING:   Reviewed preventive health counseling, as reflected in patient instructions       Regular exercise       Healthy diet/nutrition       Vision screening       Hearing screening       Contraception       Safe sex practices/STD prevention    BP Screening:   Last 3 BP Readings:    BP Readings from Last 3 Encounters:   07/31/17 124/76   03/08/17 120/78   06/21/16 130/82       The following was recommended to the patient:  Re-screen BP within a year and recommended lifestyle modifications     reports that she has never smoked. She has never used smokeless tobacco.    Estimated body mass index is 31.28 kg/(m^2) as calculated from the following:    Height as of this encounter: 1.695 m (5' 6.75\").    Weight as of this encounter: 89.9 kg (198 lb 3.2 oz).   Weight management plan: Discussed healthy diet and exercise guidelines and patient will follow up in 12 months in clinic to re-evaluate.    Counseling Resources:  ATP IV " Guidelines  Pooled Cohorts Equation Calculator  Breast Cancer Risk Calculator  FRAX Risk Assessment  ICSI Preventive Guidelines  Dietary Guidelines for Americans, 2010  Jump[employer]'s MyPlate  ASA Prophylaxis  Lung CA Screening    Patient Instructions   Finish this months pack of birth control pills then start with the new pack and skip placebo pills for that month.    Try using Aquaphor on the edge of your nose      Preventive Health Recommendations  Female Ages 26 - 39  Yearly exam:   See your health care provider every year in order to    Review health changes.     Discuss preventive care.      Review your medicines if you your doctor has prescribed any.    Until age 30: Get a Pap test every three years (more often if you have had an abnormal result).    After age 30: Talk to your doctor about whether you should have a Pap test every 3 years or have a Pap test with HPV screening every 5 years.   You do not need a Pap test if your uterus was removed (hysterectomy) and you have not had cancer.  You should be tested each year for STDs (sexually transmitted diseases), if you're at risk.   Talk to your provider about how often to have your cholesterol checked.  If you are at risk for diabetes, you should have a diabetes test (fasting glucose).  Shots: Get a flu shot each year. Get a tetanus shot every 10 years.   Nutrition:     Eat at least 5 servings of fruits and vegetables each day.    Eat whole-grain bread, whole-wheat pasta and brown rice instead of white grains and rice.    Talk to your provider about Calcium and Vitamin D.     Lifestyle    Exercise at least 150 minutes a week (30 minutes a day, 5 days of the week). This will help you control your weight and prevent disease.    Limit alcohol to one drink per day.    No smoking.     Wear sunscreen to prevent skin cancer.    See your dentist every six months for an exam and cleaning.        The information in this document, created by the medical scribe for me, accurately  reflects the services I personally performed and the decisions made by me. I have reviewed and approved this document for accuracy.   MD Ana Luisa Salgado MD  [facility]

## 2017-08-01 LAB — TSH SERPL DL<=0.005 MIU/L-ACNC: 1.68 MU/L (ref 0.4–4)

## 2017-10-19 ENCOUNTER — MYC MEDICAL ADVICE (OUTPATIENT)
Dept: FAMILY MEDICINE | Facility: CLINIC | Age: 32
End: 2017-10-19

## 2018-06-22 DIAGNOSIS — L70.9 ACNE, UNSPECIFIED ACNE TYPE: ICD-10-CM

## 2018-06-22 RX ORDER — DROSPIRENONE AND ETHINYL ESTRADIOL 0.03MG-3MG
KIT ORAL
Qty: 28 TABLET | Refills: 0 | Status: SHIPPED | OUTPATIENT
Start: 2018-06-22 | End: 2018-06-25

## 2018-06-22 NOTE — TELEPHONE ENCOUNTER
"Requested Prescriptions   Pending Prescriptions Disp Refills     drospirenone-ethinyl estradiol (MAYTE) 3-0.03 MG per tablet [Pharmacy Med Name: DROSPIRENONE-EE 3-0.03 MG TAB]  Last Written Prescription Date:  7/31/17  Last Fill Quantity: 84 tablet,  # refills: 3   Last office visit: 7/31/2017 with prescribing provider:  Dr. Bartlett   Future Office Visit:   Next 5 appointments (look out 90 days)     Aug 02, 2018  7:00 AM CDT   MyChart Physical Adult with Ana Luisa Bartlett MD   [facility] ([facility])    [address] [place] [id number] [phone]                84 tablet 3     Sig: TAKE 1 TABLET BY MOUTH DAILY    Contraceptives Protocol Passed    6/22/2018  1:19 AM       Passed - Patient is not a current smoker if age is 35 or older       Passed - Recent (12 mo) or future (30 days) visit within the authorizing provider's specialty    Patient had office visit in the last 12 months or has a visit in the next 30 days with authorizing provider or within the authorizing provider's specialty.  See \"Patient Info\" tab in inbasket, or \"Choose Columns\" in Meds & Orders section of the refill encounter.           Passed - No active pregnancy on record       Passed - No positive pregnancy test in past 12 months          "

## 2018-06-22 NOTE — TELEPHONE ENCOUNTER
Medication is being filled for 1 time refill only due to:  Due for office visit. Has appointment scheduled for 8/2/18. Needs to keep this appointment for further refills.     Pilar Dumont RN  [facility]

## 2018-06-25 ENCOUNTER — MYC MEDICAL ADVICE (OUTPATIENT)
Dept: FAMILY MEDICINE | Facility: CLINIC | Age: 33
End: 2018-06-25

## 2018-06-25 DIAGNOSIS — L70.9 ACNE, UNSPECIFIED ACNE TYPE: ICD-10-CM

## 2018-06-25 RX ORDER — DROSPIRENONE AND ETHINYL ESTRADIOL 0.03MG-3MG
1 KIT ORAL DAILY
Qty: 84 TABLET | Refills: 0 | Status: SHIPPED | OUTPATIENT
Start: 2018-06-25 | End: 2018-08-02

## 2018-06-25 NOTE — TELEPHONE ENCOUNTER
Ok for 90 day Rx  Does this need to go to mail order? A local pharmacy is listed. RN can send once clarified. Thanks!

## 2018-06-25 NOTE — TELEPHONE ENCOUNTER
Routing refill request to provider for review/approval because:  Patient is requesting a 90 day supply of medication. Per protocol, RN can only refill until 7/31/18 and patient is scheduled for physical on 8/2/2018.     Jalyn Fierro RN, BSN    Next 5 appointments (look out 90 days)     Aug 02, 2018  7:00 AM CDT   MyCbarrerat Physical Adult with Ana Luisa Bartlett MD   [facility] ([facility])    [address] [place] [id number] [phone]

## 2018-06-25 NOTE — TELEPHONE ENCOUNTER
"Requested Prescriptions   Pending Prescriptions Disp Refills     drospirenone-ethinyl estradiol (MAYTE) 3-0.03 MG per tablet  Last Written Prescription Date:  6/22/18  Last Fill Quantity: 28 tablet,  # refills: 0   Last office visit: 7/31/2017 with prescribing provider:  Dr. Bartlett   Future Office Visit:   Next 5 appointments (look out 90 days)     Aug 02, 2018  7:00 AM CDT   MyChart Physical Adult with Ana Luisa Bartlett MD   [facility]    [address] [place] [id number]   [phone]                28 tablet 0     Sig: Take 1 tablet by mouth daily    Contraceptives Protocol Passed    6/25/2018 10:58 AM       Passed - Patient is not a current smoker if age is 35 or older       Passed - Recent (12 mo) or future (30 days) visit within the authorizing provider's specialty    Patient had office visit in the last 12 months or has a visit in the next 30 days with authorizing provider or within the authorizing provider's specialty.  See \"Patient Info\" tab in inbasket, or \"Choose Columns\" in Meds & Orders section of the refill encounter.           Passed - No active pregnancy on record       Passed - No positive pregnancy test in past 12 months          "

## 2018-06-25 NOTE — TELEPHONE ENCOUNTER
Called and clarified with patient. Confirmed that local [facility] listed is where Rx needs to go. Per provider direction, writer signed Rx and patient will  from pharmacy when ready.     Medication Detail      Disp Refills Start End MARIANNE   drospirenone-ethinyl estradiol (MAYTE) 3-0.03 MG per tablet 84 tablet 0 6/25/2018  No   Sig - Route: Take 1 tablet by mouth daily - Oral   Class: E-Prescribe   Order: [id number]   E-Prescribing Status: Receipt confirmed by pharmacy (6/25/2018  3:35 PM CDT)   Printout Tracking   External Result Report   Pharmacy   [id number] - [place], [place] - [address] AT [facility]         Closing encounter.     My Rivera RN

## 2018-08-02 ENCOUNTER — OFFICE VISIT (OUTPATIENT)
Dept: FAMILY MEDICINE | Facility: CLINIC | Age: 33
End: 2018-08-02
Payer: COMMERCIAL

## 2018-08-02 VITALS
WEIGHT: 200 LBS | OXYGEN SATURATION: 100 % | RESPIRATION RATE: 16 BRPM | HEIGHT: 67 IN | HEART RATE: 71 BPM | SYSTOLIC BLOOD PRESSURE: 110 MMHG | DIASTOLIC BLOOD PRESSURE: 62 MMHG | BODY MASS INDEX: 31.39 KG/M2 | TEMPERATURE: 98.5 F

## 2018-08-02 DIAGNOSIS — Z00.00 ENCOUNTER FOR ROUTINE ADULT HEALTH EXAMINATION WITHOUT ABNORMAL FINDINGS: Primary | ICD-10-CM

## 2018-08-02 DIAGNOSIS — L70.9 ACNE, UNSPECIFIED ACNE TYPE: ICD-10-CM

## 2018-08-02 DIAGNOSIS — R25.2 LEG CRAMPS: ICD-10-CM

## 2018-08-02 DIAGNOSIS — R53.83 FATIGUE, UNSPECIFIED TYPE: ICD-10-CM

## 2018-08-02 LAB
ANION GAP SERPL CALCULATED.3IONS-SCNC: 11 MMOL/L (ref 3–14)
BASOPHILS # BLD AUTO: 0 10E9/L (ref 0–0.2)
BASOPHILS NFR BLD AUTO: 0.4 %
BUN SERPL-MCNC: 11 MG/DL (ref 7–30)
CALCIUM SERPL-MCNC: 8.9 MG/DL (ref 8.5–10.1)
CHLORIDE SERPL-SCNC: 103 MMOL/L (ref 94–109)
CHOLEST SERPL-MCNC: 193 MG/DL
CO2 SERPL-SCNC: 23 MMOL/L (ref 20–32)
CREAT SERPL-MCNC: 0.75 MG/DL (ref 0.52–1.04)
DIFFERENTIAL METHOD BLD: NORMAL
EOSINOPHIL # BLD AUTO: 0.2 10E9/L (ref 0–0.7)
EOSINOPHIL NFR BLD AUTO: 2.6 %
ERYTHROCYTE [DISTWIDTH] IN BLOOD BY AUTOMATED COUNT: 12.1 % (ref 10–15)
FERRITIN SERPL-MCNC: 60 NG/ML (ref 12–150)
GFR SERPL CREATININE-BSD FRML MDRD: 89 ML/MIN/1.7M2
GLUCOSE SERPL-MCNC: 90 MG/DL (ref 70–99)
HCT VFR BLD AUTO: 37.3 % (ref 35–47)
HDLC SERPL-MCNC: 63 MG/DL
HGB BLD-MCNC: 12.5 G/DL (ref 11.7–15.7)
LDLC SERPL CALC-MCNC: 81 MG/DL
LYMPHOCYTES # BLD AUTO: 2.4 10E9/L (ref 0.8–5.3)
LYMPHOCYTES NFR BLD AUTO: 32.4 %
MCH RBC QN AUTO: 29.6 PG (ref 26.5–33)
MCHC RBC AUTO-ENTMCNC: 33.5 G/DL (ref 31.5–36.5)
MCV RBC AUTO: 88 FL (ref 78–100)
MONOCYTES # BLD AUTO: 0.5 10E9/L (ref 0–1.3)
MONOCYTES NFR BLD AUTO: 6.4 %
NEUTROPHILS # BLD AUTO: 4.3 10E9/L (ref 1.6–8.3)
NEUTROPHILS NFR BLD AUTO: 58.2 %
NONHDLC SERPL-MCNC: 130 MG/DL
PLATELET # BLD AUTO: 284 10E9/L (ref 150–450)
POTASSIUM SERPL-SCNC: 4.1 MMOL/L (ref 3.4–5.3)
RBC # BLD AUTO: 4.23 10E12/L (ref 3.8–5.2)
SODIUM SERPL-SCNC: 137 MMOL/L (ref 133–144)
TRIGL SERPL-MCNC: 245 MG/DL
TSH SERPL DL<=0.005 MIU/L-ACNC: 2.57 MU/L (ref 0.4–4)
WBC # BLD AUTO: 7.4 10E9/L (ref 4–11)

## 2018-08-02 PROCEDURE — 99395 PREV VISIT EST AGE 18-39: CPT | Performed by: FAMILY MEDICINE

## 2018-08-02 PROCEDURE — 80048 BASIC METABOLIC PNL TOTAL CA: CPT | Performed by: FAMILY MEDICINE

## 2018-08-02 PROCEDURE — 85025 COMPLETE CBC W/AUTO DIFF WBC: CPT | Performed by: FAMILY MEDICINE

## 2018-08-02 PROCEDURE — 82728 ASSAY OF FERRITIN: CPT | Performed by: FAMILY MEDICINE

## 2018-08-02 PROCEDURE — 84443 ASSAY THYROID STIM HORMONE: CPT | Performed by: FAMILY MEDICINE

## 2018-08-02 PROCEDURE — 80061 LIPID PANEL: CPT | Performed by: FAMILY MEDICINE

## 2018-08-02 PROCEDURE — 36415 COLL VENOUS BLD VENIPUNCTURE: CPT | Performed by: FAMILY MEDICINE

## 2018-08-02 RX ORDER — DROSPIRENONE AND ETHINYL ESTRADIOL 0.03MG-3MG
1 KIT ORAL DAILY
Qty: 84 TABLET | Refills: 3 | Status: SHIPPED | OUTPATIENT
Start: 2018-08-02 | End: 2019-07-11

## 2018-08-02 NOTE — MR AVS SNAPSHOT
After Visit Summary   8/2/2018    Beatris Santo    MRN: [id number]           Patient Information     Date Of Birth          1985        Visit Information        Provider Department      8/2/2018 7:00 AM Ana Luisa Bartlett MD [facility]        Today's Diagnoses     Encounter for routine adult health examination without abnormal findings    -  1    Acne, unspecified acne type        Fatigue, unspecified type        Leg cramps          Care Instructions      Preventive Health Recommendations  Female Ages 26 - 39  * Focus on eating healthy food choices and exercising regularly.     Yearly exam:   See your health care provider every year in order to    Review health changes.     Discuss preventive care.      Review your medicines if you your doctor has prescribed any.    Until age 30: Get a Pap test every three years (more often if you have had an abnormal result).    After age 30: Talk to your doctor about whether you should have a Pap test every 3 years or have a Pap test with HPV screening every 5 years.   You do not need a Pap test if your uterus was removed (hysterectomy) and you have not had cancer.  You should be tested each year for STDs (sexually transmitted diseases), if you're at risk.   Talk to your provider about how often to have your cholesterol checked.  If you are at risk for diabetes, you should have a diabetes test (fasting glucose).  Shots: Get a flu shot each year. Get a tetanus shot every 10 years.   Nutrition:     Eat at least 5 servings of fruits and vegetables each day.    Eat whole-grain bread, whole-wheat pasta and brown rice instead of white grains and rice.    Get adequate Calcium and Vitamin D.     Lifestyle    Exercise at least 150 minutes a week (30 minutes a day, 5 days of the week). This will help you control your weight and prevent disease.    Limit alcohol to one drink per day.    No smoking.     Wear sunscreen to prevent skin  "cancer.    See your dentist every six months for an exam and cleaning.            Follow-ups after your visit        Your next 10 appointments already scheduled     Nov 29, 2018  4:15 PM CST   New Visit with Missy Smith MD   [facility]    [address] [address] [place] [id number] [phone]              Who to contact     If you have questions or need follow up information about today's clinic visit or your schedule please contact [facility] directly at [phone].  Normal or non-critical lab and imaging results will be communicated to you by ClauseMatchhart, letter or phone within 4 business days after the clinic has received the results. If you do not hear from us within 7 days, please contact the clinic through ClauseMatchhart or phone. If you have a critical or abnormal lab result, we will notify you by phone as soon as possible.  Submit refill requests through Glad [employer] Have You or call your pharmacy and they will forward the refill request to us. Please allow 3 business days for your refill to be completed.          Additional Information About Your Visit        MyChart Information     [employer] You gives you secure access to your electronic health record. If you see a primary care provider, you can also send messages to your care team and make appointments. If you have questions, please call your primary care clinic.  If you do not have a primary care provider, please call [phone] and they will assist you.        Care EveryWhere ID     This is your Care EveryWhere ID. This could be used by other organizations to access your [place] medical records  [id number]        Your Vitals Were     Pulse Temperature Respirations Height Last Period Pulse Oximetry    71 98.5  F (36.9  C) (Oral) 16 1.702 m (5' 7\") 06/17/2018 (Approximate) 100%    BMI (Body Mass Index)                   31.32 kg/m2            Blood Pressure from Last 3 Encounters:   08/02/18 110/62 "   07/31/17 124/76   03/08/17 120/78    Weight from Last 3 Encounters:   08/02/18 90.7 kg (200 lb)   07/31/17 89.9 kg (198 lb 3.2 oz)   03/08/17 87.4 kg (192 lb 9.6 oz)              We Performed the Following     Basic metabolic panel     CBC with platelets differential     Ferritin     Lipid panel reflex to direct LDL Fasting     TSH with free T4 reflex          Today's Medication Changes          These changes are accurate as of 8/2/18  7:32 AM.  If you have any questions, ask your nurse or doctor.               Stop taking these medicines if you haven't already. Please contact your care team if you have questions.     adapalene 0.1 % cream   Commonly known as:  DIFFERIN   Stopped by:  Ana Luisa Bartlett MD                Where to get your medicines      These medications were sent to [facility]/pharmacy #[id number] - [facility] [address], [place] AT [facility]  [address] [place] [address], [facility] [address]     Phone:  [phone]     drospirenone-ethinyl estradiol 3-0.03 MG per tablet                Primary Care Provider Office Phone # Fax #    Ana Luisa Bartlett [phone] [address] [facility] [address]        Equal Access to Services     [facility] : Hadii laila emmanuel hadasho Soomaali, waaxda luqadaha, qaybta kaalmada adeegyada, margarita velasco . So [facility] [phone].    ATENCIÓN: Si habla español, tiene a romero disposición servicios gratuitos de asistencia lingüística. Llame al [phone].    We comply with applicable federal civil rights laws and [place] laws. We do not discriminate on the basis of race, color, national origin, age, disability, sex, sexual orientation, or gender identity.            Thank you!     Thank you for choosing [facility]  for your care. Our goal is always to provide you with excellent care. Hearing back from our patients is one way we can continue to improve our services.  Please take a few minutes to complete the written survey that you may receive in the mail after your visit with us. Thank you!             Your Updated Medication List - Protect others around you: Learn how to safely use, store and throw away your medicines at www.disposemymeds.org.          This list is accurate as of 8/2/18  7:32 AM.  Always use your most recent med list.                   Brand Name Dispense Instructions for use Diagnosis    drospirenone-ethinyl estradiol 3-0.03 MG per tablet    MAYTE    84 tablet    Take 1 tablet by mouth daily    Acne, unspecified acne type

## 2018-08-02 NOTE — PROGRESS NOTES
SUBJECTIVE:   CC: Beatris Santo is an 32 year old woman who presents for preventive health visit.     Healthy Habits:  Answers for HPI/ROS submitted by the patient on 8/1/2018   Annual Exam:  Getting at least 3 servings of Calcium per day:: NO  Bi-annual eye exam:: Yes  Dental care twice a year:: Yes  Sleep apnea or symptoms of sleep apnea:: Daytime drowsiness  Frequency of exercise:: 2-3 days/week  Taking medications regularly:: Yes  Medication side effects:: Not applicable  Additional concerns today:: No  PHQ-2 Score: 0  Duration of exercise:: Less than 15 minutes    Exercise  -She is walking at work.   -Patient used to wear a fitbit.     Dental  -She sees the Dentist regularly.     Birth Control  -She likes her current birth control. It is helping her acne. Her menstrual periods are regular, and she is not experiencing any abdominal cramping. She is not using adapalene anymore.   -She saturates a pad every 3-4 hours.   -She is not sexually active.  -Patient does not want STD testing.   -Last Pap Smear was negative on 06-.     Breasts  -Patient feels her breasts have enlarged.     GI  -Patient does not feel like she has IBS, but has symptoms. Has occasional days where she will eat something and have mild stool changes and feel off.   -She has a regular bowel movement daily.   -Patient denies abdominal pain.     Sleep  -She gets 6-7 hours of sleep. Patient has a harder time falling asleep.     Previous Illnesses/Concerns  -Patient reports that she got the Flu in January 2018.   -Concerned about iron levels as she has not taken Iron in a while. She has felt drowsy and has not eaten much red meat.   -Concerned about potassium.     Family Medical History  -Patient's maternal grandmother past away from congestive heart failure.     Today's PHQ-2 Score:   PHQ-2 ( 1999 Pfizer) 8/1/2018 7/31/2017   Q1: Little interest or pleasure in doing things 0 0   Q2: Feeling down, depressed or hopeless 0 0    PHQ-2 Score 0 0   Q1: Little interest or pleasure in doing things Not at all -   Q2: Feeling down, depressed or hopeless Not at all -   PHQ-2 Score 0 -       Abuse: Current or Past(Physical, Sexual or Emotional)- No  Do you feel safe in your environment - Yes    Social History   Substance Use Topics     Smoking status: Never Smoker     Smokeless tobacco: Never Used     Alcohol use 0.0 oz/week      Comment: Socially - 0.5 per week     If you drink alcohol do you typically have >3 drinks per day or >7 drinks per week? Occasionally                      Reviewed orders with patient.  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       Social History   Substance Use Topics     Smoking status: Never Smoker     Smokeless tobacco: Never Used     Alcohol use 0.0 oz/week      Comment: Socially - 0.5 per week     Family History   Problem Relation Age of Onset     Diabetes Mother      Asthma Mother      Hyperlipidemia Father      Diabetes Maternal Grandmother      Hypertension Maternal Grandmother      Breast Cancer Maternal Grandmother      Asthma Maternal Grandmother      Heart Failure Maternal Grandmother       in her 80's     Hypertension Maternal Grandfather      Colon Cancer Maternal Grandfather 86     Hypertension Paternal Grandmother      Hypertension Paternal Grandfather      Coronary Artery Disease Other      Great Grandparents (Maternal and Paternal)     Hyperlipidemia Other      Great Grandparents (Paternal)         Current Outpatient Prescriptions   Medication Sig Dispense Refill     drospirenone-ethinyl estradiol (MAYTE) 3-0.03 MG per tablet Take 1 tablet by mouth daily 84 tablet 3     [DISCONTINUED] drospirenone-ethinyl  "estradiol (MAYTE) 3-0.03 MG per tablet Take 1 tablet by mouth daily 84 tablet 0     Allergies   Allergen Reactions     Dairy Aid [Lactase]        Mammogram not appropriate for this patient based on age.    Pertinent mammograms are reviewed under the imaging tab.  History of abnormal Pap smear: NO - age 30- 65 PAP every 3 years recommended  PAP / HPV 6/21/2016   PAP NIL     Reviewed and updated as needed this visit by clinical staff  Tobacco  Allergies  Meds  Med Hx  Surg Hx  Fam Hx  Soc Hx        Reviewed and updated as needed this visit by Provider        ROS:  10 point ROS of systems including Constitutional, Eyes, Respiratory, Cardiovascular, Gastroenterology, Genitourinary, Integumentary, Muscularskeletal, Psychiatric were all negative except for pertinent positives noted in my HPI.    This document serves as a record of the services and decisions personally performed and made by Ana Luisa Bartlett MD. It was created on her behalf by Bonnie Keane, a trained medical scribe. The creation of this document is based on the provider's statements to the medical scribe.  Bonnie Keane 7:14 AM August 2, 2018    OBJECTIVE:   /62 (BP Location: Right arm, Patient Position: Sitting, Cuff Size: Adult Large)  Pulse 71  Temp 98.5  F (36.9  C) (Oral)  Resp 16  Ht 1.702 m (5' 7\")  Wt 90.7 kg (200 lb)  LMP 06/17/2018 (Approximate)  SpO2 100%  BMI 31.32 kg/m2   EXAM:  GENERAL: obese, alert and no distress  EYES: Eyes grossly normal to inspection, PERRL and conjunctivae and sclerae normal  HENT: ear canals and TM's normal, nose and mouth without ulcers or lesions  NECK: no adenopathy, no asymmetry, masses, or scars and thyroid normal to palpation  RESP: lungs clear to auscultation - no rales, rhonchi or wheezes  BREAST: normal without masses, tenderness or nipple discharge and no palpable axillary masses or adenopathy  CV: regular rate and rhythm, normal S1 S2, no S3 or S4, no murmur, click or rub, no " peripheral edema and peripheral pulses strong  ABDOMEN: soft, nontender, no hepatosplenomegaly, no masses and bowel sounds normal   (female): normal female external genitalia, normal urethral meatus, vaginal mucosa pink, moist, well rugated, and normal cervix/adnexa/uterus without masses or discharge  MS: no gross musculoskeletal defects noted, no edema  SKIN: no suspicious lesions or rashes  NEURO: Normal strength and tone, mentation intact and speech normal  PSYCH: mentation appears normal, affect normal/bright    Diagnostic Test Results:  No results found for this or any previous visit (from the past 24 hour(s)).    ASSESSMENT/PLAN:   1. Encounter for routine adult health examination without abnormal findings  Physical exam completed. Patient is doing well. Counseled Patient on weight management. Discussed setting a goal of 5lb weight loss in the next year. Encouraged Patient to exercise regularly. Advised Patient to keep track of her diet, bowel movements, appetite, any blood in stool, and persisting symptoms to watch for  GI care. Recommended the Flu shot. Order placed for labs that the patient will complete today.    Body mass index is 31.32 kg/(m^2).    - Lipid panel reflex to direct LDL Fasting    2. Acne, unspecified acne type  Controlled, continue current medication.   - drospirenone-ethinyl estradiol (MAYTE) 3-0.03 MG per tablet; Take 1 tablet by mouth daily  Dispense: 84 tablet; Refill: 3    3. Fatigue, unspecified type  Patient is concerned about her iron level as she has felt fatigued. Lab work ordered for further evaluation. Will notify Patient with results.   - CBC with platelets differential  - Ferritin  - TSH with free T4 reflex    4. Leg cramps  Patient has experienced leg cramps recently. Checking lab work for further evaluation, and will notify Patient with results.   - Basic metabolic panel      COUNSELING:   Reviewed preventive health counseling, as reflected in patient instructions        "Regular exercise       Healthy diet/nutrition       Contraception       STD screening       Sleep       Flu shot    BP Readings from Last 1 Encounters:   07/31/17 124/76     Estimated body mass index is 31.32 kg/(m^2) as calculated from the following:    Height as of this encounter: 1.702 m (5' 7\").    Weight as of this encounter: 90.7 kg (200 lb).      Weight management plan: Discussed healthy diet and exercise guidelines and patient will follow up in 12 months in clinic to re-evaluate.     reports that she has never smoked. She has never used smokeless tobacco.      Counseling Resources:  ATP IV Guidelines  Pooled Cohorts Equation Calculator  Breast Cancer Risk Calculator  FRAX Risk Assessment  ICSI Preventive Guidelines  Dietary Guidelines for Americans, 2010  USDA's MyPlate  ASA Prophylaxis  Lung CA Screening    Total time spent with patient is <30 min with over 50% counseling regarding above information    The information in this document, created by the medical scribe for me, accurately reflects the services I personally performed and the decisions made by me. I have reviewed and approved this document for accuracy prior to leaving the patient care area.  August 2, 2018 8:30 AM    Ana Luisa Bartlett MD  [facility]  "

## 2018-08-02 NOTE — PATIENT INSTRUCTIONS
Preventive Health Recommendations  Female Ages 26 - 39  * Focus on eating healthy food choices and exercising regularly.     Yearly exam:   See your health care provider every year in order to    Review health changes.     Discuss preventive care.      Review your medicines if you your doctor has prescribed any.    Until age 30: Get a Pap test every three years (more often if you have had an abnormal result).    After age 30: Talk to your doctor about whether you should have a Pap test every 3 years or have a Pap test with HPV screening every 5 years.   You do not need a Pap test if your uterus was removed (hysterectomy) and you have not had cancer.  You should be tested each year for STDs (sexually transmitted diseases), if you're at risk.   Talk to your provider about how often to have your cholesterol checked.  If you are at risk for diabetes, you should have a diabetes test (fasting glucose).  Shots: Get a flu shot each year. Get a tetanus shot every 10 years.   Nutrition:     Eat at least 5 servings of fruits and vegetables each day.    Eat whole-grain bread, whole-wheat pasta and brown rice instead of white grains and rice.    Get adequate Calcium and Vitamin D.     Lifestyle    Exercise at least 150 minutes a week (30 minutes a day, 5 days of the week). This will help you control your weight and prevent disease.    Limit alcohol to one drink per day.    No smoking.     Wear sunscreen to prevent skin cancer.    See your dentist every six months for an exam and cleaning.

## 2018-08-06 ENCOUNTER — MYC MEDICAL ADVICE (OUTPATIENT)
Dept: FAMILY MEDICINE | Facility: CLINIC | Age: 33
End: 2018-08-06

## 2018-08-06 NOTE — TELEPHONE ENCOUNTER
Form printed and signed.   Please fill in remaining options from cpe  Update patient when completed  Form placed in the fax slot

## 2018-08-17 ENCOUNTER — OFFICE VISIT (OUTPATIENT)
Dept: PEDIATRICS | Facility: CLINIC | Age: 33
End: 2018-08-17
Payer: COMMERCIAL

## 2018-08-17 VITALS
WEIGHT: 199.2 LBS | HEART RATE: 86 BPM | SYSTOLIC BLOOD PRESSURE: 121 MMHG | TEMPERATURE: 98.4 F | OXYGEN SATURATION: 98 % | BODY MASS INDEX: 31.2 KG/M2 | DIASTOLIC BLOOD PRESSURE: 70 MMHG

## 2018-08-17 DIAGNOSIS — J20.9 ACUTE BRONCHITIS, UNSPECIFIED ORGANISM: Primary | ICD-10-CM

## 2018-08-17 PROCEDURE — 99213 OFFICE O/P EST LOW 20 MIN: CPT | Performed by: NURSE PRACTITIONER

## 2018-08-17 RX ORDER — AZITHROMYCIN 250 MG/1
TABLET, FILM COATED ORAL
Qty: 6 TABLET | Refills: 0 | Status: SHIPPED | OUTPATIENT
Start: 2018-08-17 | End: 2019-08-05

## 2018-08-17 RX ORDER — ALBUTEROL SULFATE 90 UG/1
2 AEROSOL, METERED RESPIRATORY (INHALATION) EVERY 4 HOURS PRN
Qty: 1 INHALER | Refills: 1 | Status: SHIPPED | OUTPATIENT
Start: 2018-08-17 | End: 2019-08-05

## 2018-08-17 NOTE — PROGRESS NOTES
SUBJECTIVE:   Beatris Santo is a 32 year old female who presents to clinic today for the following health issues:    Acute Illness   Acute illness concerns: sore throat, coughing, SOB  Onset: 5 days    Fever: no    Chills/Sweats: YES    Headache (location?): YES    Sinus Pressure:no    Conjunctivitis:  no    Ear Pain: no    Rhinorrhea: YES    Congestion: no    Sore Throat: YES     Cough: YES-non-productive, with shortness of breath    Wheeze: YES    Decreased Appetite: no    Nausea: no    Vomiting: no    Diarrhea:  no    Dysuria/Freq.: no    Fatigue/Achiness: YES    Sick/Strep Exposure: no     Therapies Tried and outcome: tea, water    Started with mild URI symptoms but now having SOB and some wheezing   Problem list and histories reviewed & adjusted, as indicated.  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       Social History   Substance Use Topics     Smoking status: Never Smoker     Smokeless tobacco: Never Used     Alcohol use 0.0 oz/week      Comment: Socially     Family History   Problem Relation Age of Onset     Diabetes Mother      Asthma Mother      Hyperlipidemia Father      Diabetes Maternal Grandmother      Hypertension Maternal Grandmother      Breast Cancer Maternal Grandmother      Asthma Maternal Grandmother      Heart Failure Maternal Grandmother       in her 80's     Hypertension Maternal Grandfather      Colon Cancer Maternal Grandfather 86     Hypertension Paternal Grandmother      Hypertension Paternal Grandfather      Coronary Artery Disease Other      Great Grandparents (Maternal and Paternal)     Hyperlipidemia Other      Great Grandparents (Paternal)     Coronary Artery Disease Other      Great Grandparents (Maternal  and Paternal)     Hyperlipidemia Other      Great Grandparents (Paternal)         Current Outpatient Prescriptions   Medication Sig Dispense Refill     drospirenone-ethinyl estradiol (MAYTE) 3-0.03 MG per tablet Take 1 tablet by mouth daily 84 tablet 3     Allergies   Allergen Reactions     Dairy Aid [Lactase]      Labs reviewed in EPIC    Reviewed and updated as needed this visit by clinical staff  Tobacco  Allergies  Meds  Med Hx  Surg Hx  Fam Hx  Soc Hx      Reviewed and updated as needed this visit by Provider         ROS:  CONSTITUTIONAL:NEGATIVE for fever, chills, change in weight  ENT/MOUTH: POSITIVE for hoarse voice, nasal congestion, postnasal drainage and sore throat and NEGATIVE for ear pain bilateral, epistaxis and fever  RESP:POSITIVE for cough-productive, SOB/dyspnea and wheezing and NEGATIVE for Hx asthma  CV: NEGATIVE for chest pain/chest pressure  GI: NEGATIVE for nausea and vomiting  MUSCULOSKELETAL: NEGATIVE for significant arthralgias or myalgia  NEURO: NEGATIVE for weakness, dizziness or paresthesias  HEME/ALLERGY/IMMUNE: NEGATIVE for allergies    OBJECTIVE:     /70 (BP Location: Right arm, Patient Position: Sitting, Cuff Size: Adult Regular)  Pulse 86  Temp 98.4  F (36.9  C) (Temporal)  Wt 199 lb 3.2 oz (90.4 kg)  LMP 08/10/2018  SpO2 98%  Breastfeeding? No  BMI 31.2 kg/m2  Body mass index is 31.2 kg/(m^2).  GENERAL: Patient is well nourished, well developed,in no apparent distress, non-toxic, in no respiratory distress and acyanotic, alert, cooperative and well hydrated  mildly ill but alert and responsive  EYES:  Right conjunctiva is not injected and without discharge.  Left conjunctiva is not injected and without discharge.  EARS: negative findings: external ears normal to inspection and palpation, TM's clear, no mastoid process tenderness,   NOSE: negative findings: nasal mucosa normal, no sinus tenderness,  Sinus not tender.  THROAT: normal.  NECK: supple with no  adenopathy,   CARDIAC:NORMAL - regular rate and rhythm without murmur.  RESP: normal respiratory rate and rhythm, lungs clear to auscultation  barky cough  ABD: Abdomen soft, non-tender.  SKIN: Skin color, texture, turgor normal. No rashes or lesions.  MS: extremities normal- no gross deformities noted, gait normal and normal muscle tone      Diagnostic Test Results:  None     ASSESSMENT/PLAN:     Beatris was seen today for uri.    Diagnoses and all orders for this visit:    Acute bronchitis, unspecified organism  -     albuterol (PROAIR HFA/PROVENTIL HFA/VENTOLIN HFA) 108 (90 Base) MCG/ACT inhaler; Inhale 2 puffs into the lungs every 4 hours as needed for shortness of breath / dyspnea or wheezing  -     azithromycin (ZITHROMAX) 250 MG tablet; Take 2 tablets the first day and then take one a day for 4 days.  -     Spacer/Aero-Holding Chambers (SPACER/AERO-HOLD CHAMBER MASK) ROBERTO; 1 Device as needed    PLAN:   1.   Symptomatic therapy suggested: OTC Robitussin DM and call prn if symptoms persist or worsen.  2.  Orders Placed This Encounter   Medications     albuterol (PROAIR HFA/PROVENTIL HFA/VENTOLIN HFA) 108 (90 Base) MCG/ACT inhaler     Sig: Inhale 2 puffs into the lungs every 4 hours as needed for shortness of breath / dyspnea or wheezing     Dispense:  1 Inhaler     Refill:  1     azithromycin (ZITHROMAX) 250 MG tablet     Sig: Take 2 tablets the first day and then take one a day for 4 days.     Dispense:  6 tablet     Refill:  0     Spacer/Aero-Holding Chambers (SPACER/AERO-HOLD CHAMBER MASK) ROBERTO     Si Device as needed     Dispense:  1 each     Refill:  0     3. Patient needs to follow up in if no improvement,or sooner if worsening of symptoms or other symptoms develop.      See Patient Instructions    GENE Ngo CNP  [facility]

## 2018-08-17 NOTE — PATIENT INSTRUCTIONS
PLAN:   1.   Symptomatic therapy suggested: OTC Robitussin DM and call prn if symptoms persist or worsen.  2.  Orders Placed This Encounter   Medications     albuterol (PROAIR HFA/PROVENTIL HFA/VENTOLIN HFA) 108 (90 Base) MCG/ACT inhaler     Sig: Inhale 2 puffs into the lungs every 4 hours as needed for shortness of breath / dyspnea or wheezing     Dispense:  1 Inhaler     Refill:  1     azithromycin (ZITHROMAX) 250 MG tablet     Sig: Take 2 tablets the first day and then take one a day for 4 days.     Dispense:  6 tablet     Refill:  0     Spacer/Aero-Holding Chambers (SPACER/AERO-HOLD CHAMBER MASK) ROBERTO     Si Device as needed     Dispense:  1 each     Refill:  0     3. Patient needs to follow up in if no improvement,or sooner if worsening of symptoms or other symptoms develop.    It was a pleasure seeing you today at the [facility]. Thank you for allowing us to care for you today. We truly hope we provided you with the excellent service you deserve. Please let us know if there is anything else we can do for you so we can be sure you are leaving completley satisfied with your care experience.       General information about your clinic   Clinic Hours Lab Hours (Appointments are required)   Mon-Thurs: 7:30 AM - 7 PM Mon-Thurs: 7:30 AM - 7 PM   Fri: 7:30 AM - 5 PM Fri: 7:30 AM - 5 PM        After Hours Nurse Advise & Appts:  [facility] Nurse Advisors: [phone]  [facility]ren On Call: to make appointments anytime: [phone] On Call Physician: call [phone] and answering service will page the on call physician.        For urgent appointments, please call [phone] and ask for the triage nurse or your care team clinic nurse.  How to contact my care team:  MyChart: [email or web address]   Phone: [phone]   Fax: [phone]       [facility]:   Phone: [phone]  Hours: 8:00 AM - 6:00 PM  Medication Refills:  Call your pharmacy and they will forward the refill to  us. Please allow 3 business days for your refills to be completed.       Normal or non-critical lab and imaging results will be communicated to you by MyChart, letter or phone within 7 days.  If you do not hear from us within 10 days, please call the clinic. If you have a critical or abnormal lab result, we will notify you by phone as soon as possible.       We now have PWIC (Pediatric Walk in Care)  Monday-Friday from 7:30-4. Simply walk in and be seen for your urgent needs like cough, fever, rash, diarrhea or vomiting, pink eye, UTI. No appointments needed. Ask one of the team for more information      -Your Care Team:    Dr. Sakina Mantilla - Internal Medicine/Pediatrics   Dr. Karon Flor - Family Medicine  Dr. Christin Chacon - Pediatrics  Dr. Theresa Rodriguez - Pediatrics  Faiza Talbert CNP - Family Practice Nurse Practitioner                   Using an Inhaler with a Spacer  To control asthma, you need to use your medicines the right way. Some medicines are inhaled using a device called a metered-dose inhaler (MDI). Metered-dose inhalers deliver medicine with a fine spray. You may be asked to use a spacer (holding tube) with your inhaler. The spacer helps make sure all the medicine you need goes into your lungs.   Steps for using an inhaler with a spacer  Step 1:    Remove the caps from the inhaler and spacer.    Shake the inhaler well and attach the spacer. If the inhaler is being used for the first time or has not been used for a while, prime it as directed by the product maker.  Step 2:    Breathe out normally.    Put the spacer between your teeth. Close your lips tightly around it.    Keep your chin up.  Step 3:    Spray 1 puff into the spacer by pressing down on the inhaler.    Then breathe in through your mouth as slowly and deeply as you can. This should take about 5-10 seconds. If you breathe too quickly, you may hear a whistling sound in certain spacers.  Step 4:    Take the spacer out of your mouth.    Hold  your breath for a count of 10.    Then hold your lips together and slowly breathe out through your mouth.          If you re prescribed more than 1 puff of medicine at a time, wait at least 30 seconds between puffs. This number may be different for different medicines. Shake the inhaler again. Then repeat steps 2 to 4.   Date Last Reviewed: 10/1/2016    4568-8917 [employer]. [address] [place] [address], [place] [address]. All rights reserved. This information is not intended as a substitute for professional medical care. Always follow your healthcare professional's instructions.

## 2018-08-17 NOTE — MR AVS SNAPSHOT
After Visit Summary   2018    Beatris Santo    MRN: [id number]           Patient Information     Date Of Birth          1985        Visit Information        Provider Department      2018 2:50 PM Faiza Talbert APRN CNP [facility]        Today's Diagnoses     Acute bronchitis, unspecified organism    -  1      Care Instructions    PLAN:   1.   Symptomatic therapy suggested: OTC Robitussin DM and call prn if symptoms persist or worsen.  2.  Orders Placed This Encounter   Medications     albuterol (PROAIR HFA/PROVENTIL HFA/VENTOLIN HFA) 108 (90 Base) MCG/ACT inhaler     Sig: Inhale 2 puffs into the lungs every 4 hours as needed for shortness of breath / dyspnea or wheezing     Dispense:  1 Inhaler     Refill:  1     azithromycin (ZITHROMAX) 250 MG tablet     Sig: Take 2 tablets the first day and then take one a day for 4 days.     Dispense:  6 tablet     Refill:  0     Spacer/Aero-Holding Chambers (SPACER/AERO-HOLD CHAMBER MASK) ROBERTO     Si Device as needed     Dispense:  1 each     Refill:  0     3. Patient needs to follow up in if no improvement,or sooner if worsening of symptoms or other symptoms develop.    It was a pleasure seeing you today at the [facility]. Thank you for allowing us to care for you today. We truly hope we provided you with the excellent service you deserve. Please let us know if there is anything else we can do for you so we can be sure you are leaving completley satisfied with your care experience.       General information about your clinic   Clinic Hours Lab Hours (Appointments are required)   Mon-Thurs: 7:30 AM - 7 PM Mon-Thurs: 7:30 AM - 7 PM   Fri: 7:30 AM - 5 PM Fri: 7:30 AM - 5 PM        After Hours Nurse Advise & Appts:  Dov Nurse Advisors: [phone]  Dov On Call: to make appointments anytime: [phone] On Call Physician: call [phone] and answering service will page  the on call physician.        For urgent appointments, please call [phone] and ask for the triage nurse or your care team clinic nurse.  How to contact my care team:  Kassi: [email or web address]   Phone: [phone]   Fax: [phone]       [facility]:   Phone: [phone]  Hours: 8:00 AM - 6:00 PM  Medication Refills:  Call your pharmacy and they will forward the refill to us. Please allow 3 business days for your refills to be completed.       Normal or non-critical lab and imaging results will be communicated to you by MyChart, letter or phone within 7 days.  If you do not hear from us within 10 days, please call the clinic. If you have a critical or abnormal lab result, we will notify you by phone as soon as possible.       We now have PWIC (Pediatric Walk in Care)  Monday-Friday from 7:30-4. Simply walk in and be seen for your urgent needs like cough, fever, rash, diarrhea or vomiting, pink eye, UTI. No appointments needed. Ask one of the team for more information      -Your Care Team:    Dr. Sakina Mantilla - Internal Medicine/Pediatrics   Dr. Karon Flor - Family Medicine  Dr. Christin Chacon - Pediatrics  Dr. Theresa Rodriguez - Pediatrics  Faiza Talbert CNP - Family Practice Nurse Practitioner                   Using an Inhaler with a Spacer  To control asthma, you need to use your medicines the right way. Some medicines are inhaled using a device called a metered-dose inhaler (MDI). Metered-dose inhalers deliver medicine with a fine spray. You may be asked to use a spacer (holding tube) with your inhaler. The spacer helps make sure all the medicine you need goes into your lungs.   Steps for using an inhaler with a spacer  Step 1:    Remove the caps from the inhaler and spacer.    Shake the inhaler well and attach the spacer. If the inhaler is being used for the first time or has not been used for a while, prime it as directed by the product maker.  Step 2:    Breathe out normally.    Put the  spacer between your teeth. Close your lips tightly around it.    Keep your chin up.  Step 3:    Spray 1 puff into the spacer by pressing down on the inhaler.    Then breathe in through your mouth as slowly and deeply as you can. This should take about 5-10 seconds. If you breathe too quickly, you may hear a whistling sound in certain spacers.  Step 4:    Take the spacer out of your mouth.    Hold your breath for a count of 10.    Then hold your lips together and slowly breathe out through your mouth.          If you re prescribed more than 1 puff of medicine at a time, wait at least 30 seconds between puffs. This number may be different for different medicines. Shake the inhaler again. Then repeat steps 2 to 4.   Date Last Reviewed: 10/1/2016    4030-3056 [employer]. [address] [place] [address]. All rights reserved. This information is not intended as a substitute for professional medical care. Always follow your healthcare professional's instructions.                Follow-ups after your visit        Your next 10 appointments already scheduled     Nov 29, 2018  4:15 PM CST   New Visit with Missy Smith MD   [facility] ([facility])    [id number] [place] [id number] [phone]              Who to contact     If you have questions or need follow up information about today's clinic visit or your schedule please contact [facility] directly at [phone].  Normal or non-critical lab and imaging results will be communicated to you by MyChart, letter or phone within 4 business days after the clinic has received the results. If you do not hear from us within 7 days, please contact the clinic through MyChart or phone. If you have a critical or abnormal lab result, we will notify you by phone as soon as possible.  Submit refill requests through [employer] or call your pharmacy and they will forward the refill request to us.  Please allow 3 business days for your refill to be completed.          Additional Information About Your Visit        [employer] Information     [employer] gives you secure access to your electronic health record. If you see a primary care provider, you can also send messages to your care team and make appointments. If you have questions, please call your primary care clinic.  If you do not have a primary care provider, please call [phone] and they will assist you.      [employer] is an electronic gateway that provides easy, online access to your medical records. With [employer], you can request a clinic appointment, read your test results, renew a prescription or communicate with your care team.     To access your existing account, please contact your [facility] or call [phone] for assistance.        Care EveryWhere ID     This is your Care EveryWhere ID. This could be used by other organizations to access your [place] medical records  [id number]        Your Vitals Were     Pulse Temperature Last Period Pulse Oximetry Breastfeeding? BMI (Body Mass Index)    86 98.4  F (36.9  C) (Temporal) 08/10/2018 98% No 31.2 kg/m2       Blood Pressure from Last 3 Encounters:   08/17/18 121/70   08/02/18 110/62   07/31/17 124/76    Weight from Last 3 Encounters:   08/17/18 199 lb 3.2 oz (90.4 kg)   08/02/18 200 lb (90.7 kg)   07/31/17 198 lb 3.2 oz (89.9 kg)              Today, you had the following     No orders found for display         Today's Medication Changes          These changes are accurate as of 8/17/18  3:35 PM.  If you have any questions, ask your nurse or doctor.               Start taking these medicines.        Dose/Directions    albuterol 108 (90 Base) MCG/ACT inhaler   Commonly known as:  PROAIR HFA/PROVENTIL HFA/VENTOLIN HFA   Used for:  Acute bronchitis, unspecified organism   Started by:  Faiza Talbert APRN CNP        Dose:  2 puff   Inhale 2 puffs into the lungs every  4 hours as needed for shortness of breath / dyspnea or wheezing   Quantity:  1 Inhaler   Refills:  1       azithromycin 250 MG tablet   Commonly known as:  ZITHROMAX   Used for:  Acute bronchitis, unspecified organism   Started by:  Faiza Talbert APRN CNP        Take 2 tablets the first day and then take one a day for 4 days.   Quantity:  6 tablet   Refills:  0       spacer/aero-hold chamber mask Kusum   Used for:  Acute bronchitis, unspecified organism   Started by:  Faiza Talbert APRN CNP        Dose:  1 Device   1 Device as needed   Quantity:  1 each   Refills:  0            Where to get your medicines      These medications were sent to [facility]/pharmacy #[id number] - [place], [place] - [address] [place] [facility]  [address] [place] [address], [facility] [address]     Phone:  [phone]     albuterol 108 (90 Base) MCG/ACT inhaler    azithromycin 250 MG tablet    spacer/aero-hold chamber mask Kusum                Primary Care Provider Office Phone # Fax #    Ana Luisa Bartlett [phone] [address] [facility] [address]        Equal Access to Services     [facility]: Hadii aad ku hadasho Sonicholasali, waaxda luqadaha, qaybta kaalmada adeegyada, margarita velasco . So [facility] [phone].    ATENCIÓN: Si habla español, tiene a romero disposición servicios gratuitos de asistencia lingüística. Ll[facility] [phone].    We comply with applicable federal civil rights laws and [place] laws. We do not discriminate on the basis of race, color, national origin, age, disability, sex, sexual orientation, or gender identity.            Thank you!     Thank you for choosing [facility]  for your care. Our goal is always to provide you with excellent care. Hearing back from our patients is one way we can continue to improve our services. Please take a few minutes to complete the written survey that you may receive in the  mail after your visit with us. Thank you!             Your Updated Medication List - Protect others around you: Learn how to safely use, store and throw away your medicines at www.disposemymeds.org.          This list is accurate as of 8/17/18  3:35 PM.  Always use your most recent med list.                   Brand Name Dispense Instructions for use Diagnosis    albuterol 108 (90 Base) MCG/ACT inhaler    PROAIR HFA/PROVENTIL HFA/VENTOLIN HFA    1 Inhaler    Inhale 2 puffs into the lungs every 4 hours as needed for shortness of breath / dyspnea or wheezing    Acute bronchitis, unspecified organism       azithromycin 250 MG tablet    ZITHROMAX    6 tablet    Take 2 tablets the first day and then take one a day for 4 days.    Acute bronchitis, unspecified organism       drospirenone-ethinyl estradiol 3-0.03 MG per tablet    MAYTE    84 tablet    Take 1 tablet by mouth daily    Acne, unspecified acne type       spacer/aero-hold chamber mask Kusum     1 each    1 Device as needed    Acute bronchitis, unspecified organism

## 2018-10-02 ENCOUNTER — OFFICE VISIT (OUTPATIENT)
Dept: DERMATOLOGY | Facility: CLINIC | Age: 33
End: 2018-10-02
Payer: COMMERCIAL

## 2018-10-02 DIAGNOSIS — L70.0 ACNE VULGARIS: Primary | ICD-10-CM

## 2018-10-02 PROCEDURE — 99202 OFFICE O/P NEW SF 15 MIN: CPT | Performed by: DERMATOLOGY

## 2018-10-02 NOTE — PROGRESS NOTES
[facility] Dermatology Note      Dermatology Problem List:  1. History of skin cancer in the family - maternal grandfather  2. Acne, mostly comedonal but with hormonal flares per patient  -BP wash qAM  -Adapalene 0.1% gel qPM  -Sal acid peels  3.     Encounter Date: Oct 2, 2018    CC:  Chief Complaint   Patient presents with     Acne     Acne and blackheads         History of Present Illness:  Ms. Beatris Santo is a 33 year old female who presents in self referral for acne. The patient is concerned about acne and black heads. She has tried a few things, creams including Differin and another prescription in the past. It has been a year or two since she used them. She used them consistently, but it didn't seem to help. She also did not like the high price she was paying. She mostly gets superficial acne, but occasionally gets a deeper pimple. It is hormonal, and flares with her menstrual cycle, typically the week before her period. She uses Neutrogena acne proofing face wash which contains salicylic acid. Benzoyl peroxide in the past has made her skin super dry and patchy. She also has some bumps on her arms that she has had forever. The patient has never seen a dermatologist. No other concerns today.       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 She has never used smokeless tobacco. She reports that she drinks alcohol. She reports that she does not use illicit drugs.       Family History:  Family History   Problem Relation Age of Onset     Diabetes Mother      Asthma Mother      Hyperlipidemia Father      Diabetes  Maternal Grandmother      Hypertension Maternal Grandmother      Breast Cancer Maternal Grandmother      Asthma Maternal Grandmother      Heart Failure Maternal Grandmother       in her 80's     Hypertension Maternal Grandfather      Colon Cancer Maternal Grandfather 86     Hypertension Paternal Grandmother      Hypertension Paternal Grandfather      Coronary Artery Disease Other      Great Grandparents (Maternal and Paternal)     Hyperlipidemia Other      Great Grandparents (Paternal)     Coronary Artery Disease Other      Great Grandparents (Maternal and Paternal)     Hyperlipidemia Other      Great Grandparents (Paternal)   History of NMSC in the family - maternal grandfather    Medications:  Current Outpatient Prescriptions   Medication Sig Dispense Refill     drospirenone-ethinyl estradiol (MAYTE) 3-0.03 MG per tablet Take 1 tablet by mouth daily 84 tablet 3     albuterol (PROAIR HFA/PROVENTIL HFA/VENTOLIN HFA) 108 (90 Base) MCG/ACT inhaler Inhale 2 puffs into the lungs every 4 hours as needed for shortness of breath / dyspnea or wheezing (Patient not taking: Reported on 10/2/2018) 1 Inhaler 1     azithromycin (ZITHROMAX) 250 MG tablet Take 2 tablets the first day and then take one a day for 4 days. (Patient not taking: Reported on 10/2/2018) 6 tablet 0     Spacer/Aero-Holding Chambers (SPACER/AERO-HOLD CHAMBER MASK) ROBERTO 1 Device as needed (Patient not taking: Reported on 10/2/2018) 1 each 0       Allergies   Allergen Reactions     Dairy Aid [Lactase]        Review of Systems:  -Constitutional: Patient is otherwise feeling well, in usual state of health.   -Skin: As above in HPI. No additional skin concerns.    Physical exam:  Vitals: There were no vitals taken for this visit.  GEN: This is a well developed, well-nourished female in no acute distress, in a pleasant mood.    SKIN: Acne exam, which includes the face, neck, upper central chest, arms and upper central back was performed.  -There are some open  comedones scattered on the nose and chin  -There are a few very superficial inflammatory papules and inflamed closed comedones on the central face  -Chest is clear  -Bilateral upper arms there are rough follicular based papules consistent with keratosis pilaris  -No other lesions of concern on areas examined.       Impression/Plan:  1. Acne vulgaris, mostly comedonal in nature but with hormonal flares per the patient    Recommended Neutrogena Clear Pore with benzoyl peroxide, use as a face wash daily in the morning.    Recommended Differin 0.1% at bedtime. Instructed to begin applying every third night, then every other night, then every night if tolerated increasing every two weeks.     Discussed treatment with chemical peels as a cosmetic option to treat it. Salicylic acid chemical peel would be a good option for her. Patient is aware this is a cosmetic procedure, and insurance will not cover it. Will have nursing go through packages with her today so she can decide if she wants to pursue this.    Discussed that patient is already on Rosanne for birth control which contains drospirenone (likely the most helpful progesterone for acne).    2. Keratosis pilaris     Moisturize with CeraVe - SA or ammonium lactate. Apply daily to see the best benefit.     Follow-up in 6 months for acne recheck, sooner for flares.    Staff Involved:  Scribe/Staff    Scribe Disclosure  I, Keeley Jimenez, am serving as a scribe to document services personally performed by Dr. Jaelyn Partida MD, based on data collection and the provider's statements to me.     Provider Disclosure:   The documentation recorded by the scribe accurately reflects the services I personally performed and the decisions made by me.    Jaelyn Partida MD    Department of Dermatology  [facility]: Phone: [phone], Fax:[phone]  [facility]: Phone: [phone], Fax: [phone]

## 2018-10-02 NOTE — MR AVS SNAPSHOT
After Visit Summary   10/2/2018    Beatris Santo    MRN: [id number]           Patient Information     Date Of Birth          1985        Visit Information        Provider Department      10/2/2018 3:30 PM Jaelyn Partida MD [facility]        Care Instructions    Neutrogena clear pore with benzoyl peroxide, use as a face wash.   Discussed treatment with chemical peels as a cosmetic option to treat it. Salicylic acid chemical peel would be a good option for her. Three peels was recommended to help see the most effective treatment.   Differin 0.1% begin applying every third night, then every other night, then every night. Apply to the whole face, or avoid dry areas of skin.    Moisturize with CeraVe - SA or ammonium lactate. Apply daily to see the best benefit.     [phone] call when ready for Peels           Follow-ups after your visit        Who to contact     If you have questions or need follow up information about today's clinic visit or your schedule please contact [facility] directly at [phone].  Normal or non-critical lab and imaging results will be communicated to you by [employer], letter or phone within 4 business days after the clinic has received the results. If you do not hear from us within 7 days, please contact the clinic through [employer] or phone. If you have a critical or abnormal lab result, we will notify you by phone as soon as possible.  Submit refill requests through [employer] or call your pharmacy and they will forward the refill request to us. Please allow 3 business days for your refill to be completed.          Additional Information About Your Visit        MyChart Information     [employer] gives you secure access to your electronic health record. If you see a primary care provider, you can also send messages to your care team and make appointments. If you have questions, please call your primary care clinic.  If you do not  have a primary care provider, please call [phone] and they will assist you.      [employer] is an electronic gateway that provides easy, online access to your medical records. With [employer], you can request a clinic appointment, read your test results, renew a prescription or communicate with your care team.     To access your existing account, please contact your [facility] or call [phone] for assistance.        Care EveryWhere ID     This is your Care EveryWhere ID. This could be used by other organizations to access your [place] medical records  [id number]         Blood Pressure from Last 3 Encounters:   08/17/18 121/70   08/02/18 110/62   07/31/17 124/76    Weight from Last 3 Encounters:   08/17/18 90.4 kg (199 lb 3.2 oz)   08/02/18 90.7 kg (200 lb)   07/31/17 89.9 kg (198 lb 3.2 oz)              Today, you had the following     No orders found for display       Primary Care Provider Office Phone # Fax #    Ana Luisa Bartlett [phone] [address]  [facility] [address]        Equal Access to Services     SHREE MICHELLE : Hadii aad ku hadasho Soomaali, waaxda luqadaha, qaybta kaalmada adeegyada, margarita velascon emeli mike. So [facility] [phone].    ATENCIÓN: Si habla español, tiene a romero disposición servicios gratuitos de asistencia lingüística. [facility] [phone].    We comply with applicable federal civil rights laws and [place] laws. We do not discriminate on the basis of race, color, national origin, age, disability, sex, sexual orientation, or gender identity.            Thank you!     Thank you for choosing [facility]  for your care. Our goal is always to provide you with excellent care. Hearing back from our patients is one way we can continue to improve our services. Please take a few minutes to complete the written survey that you may receive in the mail after your visit with us. Thank you!              Your Updated Medication List - Protect others around you: Learn how to safely use, store and throw away your medicines at www.disposemymeds.org.          This list is accurate as of 10/2/18  3:45 PM.  Always use your most recent med list.                   Brand Name Dispense Instructions for use Diagnosis    albuterol 108 (90 Base) MCG/ACT inhaler    PROAIR HFA/PROVENTIL HFA/VENTOLIN HFA    1 Inhaler    Inhale 2 puffs into the lungs every 4 hours as needed for shortness of breath / dyspnea or wheezing    Acute bronchitis, unspecified organism       azithromycin 250 MG tablet    ZITHROMAX    6 tablet    Take 2 tablets the first day and then take one a day for 4 days.    Acute bronchitis, unspecified organism       drospirenone-ethinyl estradiol 3-0.03 MG per tablet    MAYTE    84 tablet    Take 1 tablet by mouth daily    Acne, unspecified acne type       spacer/aero-hold chamber mask Kusum     1 each    1 Device as needed    Acute bronchitis, unspecified organism

## 2018-10-02 NOTE — PATIENT INSTRUCTIONS
Neutrogena clear pore with benzoyl peroxide, use as a face wash.   Discussed treatment with chemical peels as a cosmetic option to treat it. Salicylic acid chemical peel would be a good option for her. Three peels was recommended to help see the most effective treatment.   Differin 0.1% begin applying every third night, then every other night, then every night. Apply to the whole face, or avoid dry areas of skin.    Moisturize with CeraVe - SA or ammonium lactate. Apply daily to see the best benefit.     [phone] call when ready for Peels

## 2018-10-02 NOTE — NURSING NOTE
Beatris Santo's goals for this visit include:   Chief Complaint   Patient presents with     Acne     Acne and blackheads       She requests these members of her care team be copied on today's visit information: Yes     PCP: Ana Luisa Bartlett    Referring Provider:  Referred Self, MD  No address on file    There were no vitals taken for this visit.    Do you need any medication refills at today's visit? No

## 2018-10-02 NOTE — LETTER
10/2/2018          RE: Beatris Santo  [address] [address] [place] [facility] [address]        Dear Colleague,    Thank you for referring your patient, Beatris Santo, to the [facility]. Please see a copy of my visit note below.    [facility] Dermatology Note      Dermatology Problem List:  1. History of skin cancer in the family - maternal grandfather  2. Acne, mostly comedonal but with hormonal flares per patient  -BP wash qAM  -Adapalene 0.1% gel qPM  -Sal acid peels  3.     Encounter Date: Oct 2, 2018    CC:  Chief Complaint   Patient presents with     Acne     Acne and blackheads         History of Present Illness:  Ms. Beatris Santo is a 33 year old female who presents in self referral for acne. The patient is concerned about acne and black heads. She has tried a few things, creams including Differin and another prescription in the past. It has been a year or two since she used them. She used them consistently, but it didn't seem to help. She also did not like the high price she was paying. She mostly gets superficial acne, but occasionally gets a deeper pimple. It is hormonal, and flares with her menstrual cycle, typically the week before her period. She uses Neutrogena acne proofing face wash which contains salicylic acid. Benzoyl peroxide in the past has made her skin super dry and patchy. She also has some bumps on her arms that she has had forever. The patient has never seen a dermatologist. No other concerns today.       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 She has  never used smokeless tobacco. She reports that she drinks alcohol. She reports that she does not use illicit drugs.       Family History:  Family History   Problem Relation Age of Onset     Diabetes Mother      Asthma Mother      Hyperlipidemia Father      Diabetes Maternal Grandmother      Hypertension Maternal Grandmother      Breast Cancer Maternal Grandmother      Asthma Maternal Grandmother      Heart Failure Maternal Grandmother       in her 80's     Hypertension Maternal Grandfather      Colon Cancer Maternal Grandfather 86     Hypertension Paternal Grandmother      Hypertension Paternal Grandfather      Coronary Artery Disease Other      Great Grandparents (Maternal and Paternal)     Hyperlipidemia Other      Great Grandparents (Paternal)     Coronary Artery Disease Other      Great Grandparents (Maternal and Paternal)     Hyperlipidemia Other      Great Grandparents (Paternal)   History of NMSC in the family - maternal grandfather    Medications:  Current Outpatient Prescriptions   Medication Sig Dispense Refill     drospirenone-ethinyl estradiol (MAYTE) 3-0.03 MG per tablet Take 1 tablet by mouth daily 84 tablet 3     albuterol (PROAIR HFA/PROVENTIL HFA/VENTOLIN HFA) 108 (90 Base) MCG/ACT inhaler Inhale 2 puffs into the lungs every 4 hours as needed for shortness of breath / dyspnea or wheezing (Patient not taking: Reported on 10/2/2018) 1 Inhaler 1     azithromycin (ZITHROMAX) 250 MG tablet Take 2 tablets the first day and then take one a day for 4 days. (Patient not taking: Reported on 10/2/2018) 6 tablet 0     Spacer/Aero-Holding Chambers (SPACER/AERO-HOLD CHAMBER MASK) ROBERTO 1 Device as needed (Patient not taking: Reported on 10/2/2018) 1 each 0       Allergies   Allergen Reactions     Dairy Aid [Lactase]        Review of Systems:  -Constitutional: Patient is otherwise feeling well, in usual state of health.   -Skin: As above in HPI. No additional skin concerns.    Physical exam:  Vitals: There  were no vitals taken for this visit.  GEN: This is a well developed, well-nourished female in no acute distress, in a pleasant mood.    SKIN: Acne exam, which includes the face, neck, upper central chest, arms and upper central back was performed.  -There are some open comedones scattered on the nose and chin  -There are a few very superficial inflammatory papules and inflamed closed comedones on the central face  -Chest is clear  -Bilateral upper arms there are rough follicular based papules consistent with keratosis pilaris  -No other lesions of concern on areas examined.       Impression/Plan:  1. Acne vulgaris, mostly comedonal in nature but with hormonal flares per the patient    Recommended Neutrogena Clear Pore with benzoyl peroxide, use as a face wash daily in the morning.    Recommended Differin 0.1% at bedtime. Instructed to begin applying every third night, then every other night, then every night if tolerated increasing every two weeks.     Discussed treatment with chemical peels as a cosmetic option to treat it. Salicylic acid chemical peel would be a good option for her. Patient is aware this is a cosmetic procedure, and insurance will not cover it. Will have nursing go through packages with her today so she can decide if she wants to pursue this.    Discussed that patient is already on Rosanne for birth control which contains drospirenone (likely the most helpful progesterone for acne).    2. Keratosis pilaris     Moisturize with CeraVe - SA or ammonium lactate. Apply daily to see the best benefit.     Follow-up in 6 months for acne recheck, sooner for flares.    Staff Involved:  Scribe/Staff    Scribe Disclosure  I, Keeley Jimenez, am serving as a scribe to document services personally performed by Dr. Jaelyn Partida MD, based on data collection and the provider's statements to me.     Provider Disclosure:   The documentation recorded by the scribe accurately reflects the services I personally  performed and the decisions made by me.    Jaelyn Partida MD    Department of Dermatology  [facility]: Phone: [phone], Fax:[phone]  [facility]: Phone: [phone], Fax: [phone]              Again, thank you for allowing me to participate in the care of your patient.        Sincerely,        Jaelyn Partida MD

## 2019-07-11 DIAGNOSIS — L70.9 ACNE, UNSPECIFIED ACNE TYPE: ICD-10-CM

## 2019-07-11 NOTE — TELEPHONE ENCOUNTER
"Requested Prescriptions   Pending Prescriptions Disp Refills     drospirenone-ethinyl estradiol (MAYTE) 3-0.03 MG tablet [Pharmacy Med Name: DROSPIRENONE-EE 3-0.03 MG TAB] 84 tablet 3     Sig: TAKE 1 TABLET BY MOUTH EVERY DAY       Contraceptives Protocol Passed - 7/11/2019  7:45 AM        Passed - Patient is not a current smoker if age is 35 or older        Passed - Recent (12 mo) or future (30 days) visit within the authorizing provider's specialty     Patient had office visit in the last 12 months or has a visit in the next 30 days with authorizing provider or within the authorizing provider's specialty.  See \"Patient Info\" tab in inbasket, or \"Choose Columns\" in Meds & Orders section of the refill encounter.              Passed - Medication is active on med list        Passed - No active pregnancy on record        Passed - No positive pregnancy test in past 12 months        drospirenone-ethinyl estradiol (MAYTE) 3-0.03 MG per tablet  Last Written Prescription Date:  8/2/18  Last Fill Quantity: 84,  # refills: 3   Last office visit: 8/2/2018 with prescribing provider:  Dr. Bartlett   Future Office Visit:   Next 5 appointments (look out 90 days)    Aug 05, 2019  7:00 AM CDT  PHYSICAL with Ana Luisa Bartlett MD  [facility] [address] [place] [id number] [phone]           "

## 2019-07-12 RX ORDER — DROSPIRENONE AND ETHINYL ESTRADIOL 0.03MG-3MG
KIT ORAL
Qty: 28 TABLET | Refills: 0 | Status: SHIPPED | OUTPATIENT
Start: 2019-07-12 | End: 2019-07-15

## 2019-07-12 NOTE — TELEPHONE ENCOUNTER
Medication is being filled for 1 time refill only due to:  Patient needs to be seen because she is due for OV next month.     Per chart review, patient is scheduled to see Dr. Bartlett on 8/5/19.    MADONNA LukeN, RN, PHN

## 2019-07-15 ENCOUNTER — TELEPHONE (OUTPATIENT)
Dept: FAMILY MEDICINE | Facility: CLINIC | Age: 34
End: 2019-07-15

## 2019-07-15 DIAGNOSIS — L70.9 ACNE, UNSPECIFIED ACNE TYPE: ICD-10-CM

## 2019-07-15 RX ORDER — DROSPIRENONE AND ETHINYL ESTRADIOL 0.03MG-3MG
1 KIT ORAL DAILY
Qty: 84 TABLET | Refills: 0 | Status: SHIPPED | OUTPATIENT
Start: 2019-07-15 | End: 2019-08-05

## 2019-07-15 NOTE — TELEPHONE ENCOUNTER
Message from [facility] [place] [phone]    drospirenone-ethinyl estradiol (MAYTE) 3-0.03 MG tablet    Alternative requested. Patients Insurance requires 90 days fill.  Current RX is only 1 month.  Patient states she has an appt. Set up already in 3 weeks.  Please send new RX for 3 months per Insurance.    Hellen Dorsey

## 2019-08-02 ASSESSMENT — ENCOUNTER SYMPTOMS
WEAKNESS: 0
FEVER: 0
JOINT SWELLING: 0
PARESTHESIAS: 0
PALPITATIONS: 0
SORE THROAT: 0
MYALGIAS: 0
HEMATOCHEZIA: 0
SHORTNESS OF BREATH: 0
HEARTBURN: 0
NERVOUS/ANXIOUS: 0
ARTHRALGIAS: 0
DIZZINESS: 0
COUGH: 0
FREQUENCY: 0
CONSTIPATION: 0
EYE PAIN: 0
HEMATURIA: 0
HEADACHES: 0
DIARRHEA: 0
CHILLS: 0
BREAST MASS: 0
NAUSEA: 0
ABDOMINAL PAIN: 0
DYSURIA: 0

## 2019-08-05 ENCOUNTER — OFFICE VISIT (OUTPATIENT)
Dept: FAMILY MEDICINE | Facility: CLINIC | Age: 34
End: 2019-08-05
Payer: COMMERCIAL

## 2019-08-05 VITALS
HEIGHT: 67 IN | DIASTOLIC BLOOD PRESSURE: 83 MMHG | BODY MASS INDEX: 30.92 KG/M2 | TEMPERATURE: 97.9 F | RESPIRATION RATE: 16 BRPM | OXYGEN SATURATION: 99 % | HEART RATE: 63 BPM | SYSTOLIC BLOOD PRESSURE: 124 MMHG | WEIGHT: 197 LBS

## 2019-08-05 DIAGNOSIS — Z00.00 ENCOUNTER FOR ROUTINE ADULT HEALTH EXAMINATION WITHOUT ABNORMAL FINDINGS: Primary | ICD-10-CM

## 2019-08-05 DIAGNOSIS — N62 BREAST HYPERTROPHY: ICD-10-CM

## 2019-08-05 DIAGNOSIS — Z12.4 SCREENING FOR MALIGNANT NEOPLASM OF CERVIX: ICD-10-CM

## 2019-08-05 DIAGNOSIS — L70.9 ACNE, UNSPECIFIED ACNE TYPE: ICD-10-CM

## 2019-08-05 LAB
CHOLEST SERPL-MCNC: 229 MG/DL
GLUCOSE SERPL-MCNC: 91 MG/DL (ref 70–99)
HDLC SERPL-MCNC: 80 MG/DL
LDLC SERPL CALC-MCNC: 115 MG/DL
NONHDLC SERPL-MCNC: 149 MG/DL
PROLACTIN SERPL-MCNC: 13 UG/L (ref 3–27)
TRIGL SERPL-MCNC: 169 MG/DL

## 2019-08-05 PROCEDURE — 82947 ASSAY GLUCOSE BLOOD QUANT: CPT | Performed by: FAMILY MEDICINE

## 2019-08-05 PROCEDURE — 84146 ASSAY OF PROLACTIN: CPT | Performed by: FAMILY MEDICINE

## 2019-08-05 PROCEDURE — 80061 LIPID PANEL: CPT | Performed by: FAMILY MEDICINE

## 2019-08-05 PROCEDURE — G0145 SCR C/V CYTO,THINLAYER,RESCR: HCPCS | Performed by: FAMILY MEDICINE

## 2019-08-05 PROCEDURE — 36415 COLL VENOUS BLD VENIPUNCTURE: CPT | Performed by: FAMILY MEDICINE

## 2019-08-05 PROCEDURE — 99395 PREV VISIT EST AGE 18-39: CPT | Performed by: FAMILY MEDICINE

## 2019-08-05 PROCEDURE — 87624 HPV HI-RISK TYP POOLED RSLT: CPT | Performed by: FAMILY MEDICINE

## 2019-08-05 RX ORDER — DROSPIRENONE AND ETHINYL ESTRADIOL 0.03MG-3MG
1 KIT ORAL DAILY
Qty: 84 TABLET | Refills: 3 | Status: SHIPPED | OUTPATIENT
Start: 2019-08-05 | End: 2020-08-27

## 2019-08-05 ASSESSMENT — ENCOUNTER SYMPTOMS
PARESTHESIAS: 0
COUGH: 0
PALPITATIONS: 0
DIZZINESS: 0
MYALGIAS: 0
HEADACHES: 0
BREAST MASS: 0
ABDOMINAL PAIN: 0
EYE PAIN: 0
NERVOUS/ANXIOUS: 0
DIARRHEA: 0
SHORTNESS OF BREATH: 0
ARTHRALGIAS: 0
CHILLS: 0
CONSTIPATION: 0
DYSURIA: 0
FEVER: 0
NAUSEA: 0
HEARTBURN: 0
HEMATURIA: 0
FREQUENCY: 0
JOINT SWELLING: 0
SORE THROAT: 0
WEAKNESS: 0
HEMATOCHEZIA: 0

## 2019-08-05 ASSESSMENT — PAIN SCALES - GENERAL: PAINLEVEL: NO PAIN (0)

## 2019-08-05 ASSESSMENT — MIFFLIN-ST. JEOR: SCORE: 1640.18

## 2019-08-05 NOTE — PROGRESS NOTES
SUBJECTIVE:   CC: Beatris Santo is an 33 year old woman who presents for preventive health visit.     Healthy Habits:     Getting at least 3 servings of Calcium per day:  NO    Bi-annual eye exam:  Yes    Dental care twice a year:  Yes    Sleep apnea or symptoms of sleep apnea:  None    Diet:  Regular (no restrictions)    Frequency of exercise:  None    Taking medications regularly:  Yes    Medication side effects:  Not applicable    PHQ-2 Total Score: 0    Additional concerns today:  No    -Patient is happy with her birth control pill. She says it has been working well, and helping her control her acne. She still occasionally gets break-outs but it is manageable. She uses it for acne purposes, not contraceptive purposes. Her periods are normal.  -She participated in a fitness program in the past and lost about 10 pounds, but she stopped going because the gym was charging more money than she thought was worth it. She liked the exercise routines the program did, but they put her on a very restrictive diet that was not realistic for her. She says that she also feels really lazy.  Wt Readings from Last 4 Encounters:   08/05/19 89.4 kg (197 lb)   08/17/18 90.4 kg (199 lb 3.2 oz)   08/02/18 90.7 kg (200 lb)   07/31/17 89.9 kg (198 lb 3.2 oz)     -Reports her mental health has been good lately  -Patient has had a few issues swallowing when she is talking or if she breathes in wrong.   -She sleeps on her side and occasionally coughs, when she turns onto her back the cough goes away. Denies post nasal drainage, SOB, MCNEAL  -Her breasts have grown a lot lately and she thinks it is due to her birth control pill. She denies noticing any lumps in the breasts or discharge from the nipples.  -She has had more diarrhea lately which she attributes to eating dairy since she is lactose intolerant   -Wondering about recommendations for skin changes and moles. She is not consistent using sunscreen and her grandfather had  melanoma. She is unsure what moles are new/old since she has not paid much attention to it before.       Today's PHQ-2 Score:   PHQ-2 (  [employer]) 2019   Q1: Little interest or pleasure in doing things 0   Q2: Feeling down, depressed or hopeless 0   PHQ-2 Score 0   Q1: Little interest or pleasure in doing things Not at all   Q2: Feeling down, depressed or hopeless Not at all   PHQ-2 Score 0       Abuse: Current or Past(Physical, Sexual or Emotional)- No  Do you feel safe in your environment? Yes    Social History     Tobacco Use     Smoking status: Never Smoker     Smokeless tobacco: Never Used   Substance Use Topics     Alcohol use: Yes     Alcohol/week: 0.0 oz     Comment: Socially     If you drink alcohol do you typically have >3 drinks per day or >7 drinks per week? No  -Patient reports 0-2 drinks per week, socially    Alcohol Use 2019   Prescreen: >3 drinks/day or >7 drinks/week? -   Prescreen: >3 drinks/day or >7 drinks/week? No       Reviewed orders with patient.  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       Social History     Tobacco Use     Smoking status: Never Smoker     Smokeless tobacco: Never Used   Substance Use Topics     Alcohol use: Yes     Alcohol/week: 0.0 oz     Comment: Socially     Family History   Problem Relation Age of Onset     Diabetes Mother      Asthma Mother      Hyperlipidemia Father      Diabetes Maternal Grandmother      Hypertension Maternal Grandmother      Breast Cancer Maternal Grandmother      Asthma Maternal Grandmother      Heart Failure Maternal Grandmother          in her 80's     Hypertension Maternal Grandfather      Colon Cancer Maternal Grandfather 86     Hypertension  Paternal Grandmother      Hypertension Paternal Grandfather      Coronary Artery Disease Other         Great Grandparents (Maternal and Paternal)     Hyperlipidemia Other         Great Grandparents (Paternal)     Coronary Artery Disease Other         Great Grandparents (Maternal and Paternal)     Hyperlipidemia Other         Great Grandparents (Paternal)           Mammogram not appropriate for this patient based on age.    Pertinent mammograms are reviewed under the imaging tab.  History of abnormal Pap smear: NO - age 30-65 PAP every 5 years with negative HPV co-testing recommended  PAP / HPV 6/21/2016   PAP NIL     Reviewed and updated as needed this visit by clinical staff  Tobacco  Allergies  Meds  Med Hx  Surg Hx  Fam Hx  Soc Hx        Reviewed and updated as needed this visit by Provider            Review of Systems   Constitutional: Negative for chills and fever.   HENT: Negative for congestion, ear pain, hearing loss and sore throat.    Eyes: Negative for pain and visual disturbance.   Respiratory: Negative for cough and shortness of breath.    Cardiovascular: Negative for chest pain, palpitations and peripheral edema.   Gastrointestinal: Negative for abdominal pain, constipation, diarrhea, heartburn, hematochezia and nausea.   Breasts:  Negative for tenderness, breast mass and discharge.   Genitourinary: Negative for dysuria, frequency, genital sores, hematuria, pelvic pain, urgency, vaginal bleeding and vaginal discharge.   Musculoskeletal: Negative for arthralgias, joint swelling and myalgias.   Skin: Negative for rash.   Neurological: Negative for dizziness, weakness, headaches and paresthesias.   Psychiatric/Behavioral: Negative for mood changes. The patient is not nervous/anxious.      This document serves as a record of the services and decisions personally performed by NAI HARDIN. It was created on his/her behalf by Mylene Gonzales, a trained medical scribe. The creation of this  "document is based on the provider's statements to the medical scribe. Mylene Gonzales, August 5, 2019 7:10 AM       OBJECTIVE:   /83 (BP Location: Right arm, Patient Position: Chair, Cuff Size: Adult Large)   Pulse 63   Temp 97.9  F (36.6  C) (Oral)   Resp 16   Ht 1.708 m (5' 7.25\")   Wt 89.4 kg (197 lb)   LMP  (LMP Unknown)   SpO2 99%   Breastfeeding? No   BMI 30.63 kg/m    Physical Exam  GENERAL: healthy, alert and no distress  EYES: Eyes grossly normal to inspection, PERRL and conjunctivae and sclerae normal  HENT: ear canals and TM's normal, nose and mouth without ulcers or lesions  NECK: no adenopathy, no asymmetry, masses, or scars and thyroid normal to palpation  RESP: lungs clear to auscultation - no rales, rhonchi or wheezes  BREAST: normal without masses, tenderness or nipple discharge and no palpable axillary masses or adenopathy  CV: regular rate and rhythm, normal S1 S2, no S3 or S4, no murmur, click or rub, no peripheral edema and peripheral pulses strong  ABDOMEN: soft, nontender, no hepatosplenomegaly, no masses and bowel sounds normal   (female): right posterior labia tiny erythematous eschar, otherwise normal female external genitalia, normal urethral meatus, vaginal mucosa pink, moist, well rugated, and normal cervix/adnexa/uterus without masses or discharge  MS: no gross musculoskeletal defects noted, no edema  SKIN:  no suspicious lesions or rashes  NEURO: Normal strength and tone, mentation intact and speech normal  PSYCH: mentation appears normal, affect normal/bright    Component      Latest Ref Rng & Units 8/2/2018   WBC      4.0 - 11.0 10e9/L 7.4   RBC Count      3.8 - 5.2 10e12/L 4.23   Hemoglobin      11.7 - 15.7 g/dL 12.5   Hematocrit      35.0 - 47.0 % 37.3   MCV      78 - 100 fl 88   MCH      26.5 - 33.0 pg 29.6   MCHC      31.5 - 36.5 g/dL 33.5   RDW      10.0 - 15.0 % 12.1   Platelet Count      150 - 450 10e9/L 284   Diff Method       Automated Method   % " Neutrophils      % 58.2   % Lymphocytes      % 32.4   % Monocytes      % 6.4   % Eosinophils      % 2.6   % Basophils      % 0.4   Absolute Neutrophil      1.6 - 8.3 10e9/L 4.3   Absolute Lymphocytes      0.8 - 5.3 10e9/L 2.4   Absolute Monocytes      0.0 - 1.3 10e9/L 0.5   Absolute Eosinophils      0.0 - 0.7 10e9/L 0.2   Absolute Basophils      0.0 - 0.2 10e9/L 0.0   Sodium      133 - 144 mmol/L 137   Potassium      3.4 - 5.3 mmol/L 4.1   Chloride      94 - 109 mmol/L 103   Carbon Dioxide      20 - 32 mmol/L 23   Anion Gap      3 - 14 mmol/L 11   Glucose      70 - 99 mg/dL 90   Urea Nitrogen      7 - 30 mg/dL 11   Creatinine      0.52 - 1.04 mg/dL 0.75   GFR Estimate      >60 mL/min/1.7m2 89   GFR Estimate If Black      >60 mL/min/1.7m2 >90   Calcium      8.5 - 10.1 mg/dL 8.9   Cholesterol      <200 mg/dL 193   Triglycerides      <150 mg/dL 245 (H)   HDL Cholesterol      >49 mg/dL 63   LDL Cholesterol Calculated      <100 mg/dL 81   Non HDL Cholesterol      <130 mg/dL 130 (H)   Ferritin      12 - 150 ng/mL 60   TSH      0.40 - 4.00 mU/L 2.57       ASSESSMENT/PLAN:   1. Encounter for routine adult health examination without abnormal findings  Declined ENT referral for coughing/upper respiratory symptoms, patient would like to try Flonase first. Declined STD and infectious control screening, pt is not concerned about this since she has not been sexually active recently  Reviewed abcde's of skin exams and offered derm referral. She was not interested in this.   - Lipid panel reflex to direct LDL Fasting  - Glucose    2. Screening for malignant neoplasm of cervix  - Pap imaged thin layer screen with HPV - recommended age 30 - 65 years (select HPV order below)  - HPV High Risk Types DNA Cervical    3. Acne, unspecified acne type  Controlled.   - drospirenone-ethinyl estradiol (MAYTE) 3-0.03 MG tablet; Take 1 tablet by mouth daily  Dispense: 84 tablet; Refill: 3    4. Breast hypertrophy  Possibly due to ocp. Continue to  "monitor.   - Prolactin        COUNSELING:  Reviewed preventive health counseling, as reflected in patient instructions       Regular exercise       Healthy diet/nutrition       Vision screening       Hearing screening       Immunizations       Alcohol Use       Safe sex practices/STD prevention    Estimated body mass index is 30.63 kg/m  as calculated from the following:    Height as of this encounter: 1.708 m (5' 7.25\").    Weight as of this encounter: 89.4 kg (197 lb).    Weight management plan: Discussed healthy diet and exercise guidelines     reports that she has never smoked. She has never used smokeless tobacco.    Patient Instructions   I recommend keeping a food and activity diary. Keeping track of what you eat will help you to be more aware of the choices you're making and can assist with weight loss. There are many phone apps to do this, a popular one is [employer]. It will break down what you eat into carbs, fat, and protein and help you set goals.     Get over the counter Flonase and use for one month. If no improvement in symptoms, discontinue and we can explore other options.    Do a monthy skin exam and look for the ABCDEs:  Asymmetry   Borders - jagged   Color  Diameter - bigger than pencil eraser  Enlarging       Counseling Resources:  ATP IV Guidelines  Pooled Cohorts Equation Calculator  Breast Cancer Risk Calculator  FRAX Risk Assessment  ICSI Preventive Guidelines  Dietary Guidelines for Americans, 2010  USDA's MyPlate  ASA Prophylaxis  Lung CA Screening    The information in this document, created by the medical scribe for me, accurately reflects the services I personally performed and the decisions made by me. I have reviewed and approved this document for accuracy.   Ana Luisa Bartlett MD  [facility]  "

## 2019-08-05 NOTE — PATIENT INSTRUCTIONS
I recommend keeping a food and activity diary. Keeping track of what you eat will help you to be more aware of the choices you're making and can assist with weight loss. There are many phone apps to do this, a popular one is [employer]. It will break down what you eat into carbs, fat, and protein and help you set goals.     Get over the counter Flonase and use for one month. If no improvement in symptoms, discontinue and we can explore other options.    Do a monthy skin exam and look for the ABCDEs:  Asymmetry   Borders - jagged   Color  Diameter - bigger than pencil eraser  Enlarging

## 2019-08-07 LAB
COPATH REPORT: NORMAL
PAP: NORMAL

## 2019-08-08 LAB
FINAL DIAGNOSIS: NORMAL
HPV HR 12 DNA CVX QL NAA+PROBE: NEGATIVE
HPV16 DNA SPEC QL NAA+PROBE: NEGATIVE
HPV18 DNA SPEC QL NAA+PROBE: NEGATIVE
SPECIMEN DESCRIPTION: NORMAL
SPECIMEN SOURCE CVX/VAG CYTO: NORMAL

## 2020-03-10 ENCOUNTER — HEALTH MAINTENANCE LETTER (OUTPATIENT)
Age: 35
End: 2020-03-10

## 2020-08-27 DIAGNOSIS — L70.9 ACNE, UNSPECIFIED ACNE TYPE: ICD-10-CM

## 2020-08-27 RX ORDER — DROSPIRENONE AND ETHINYL ESTRADIOL 0.03MG-3MG
1 KIT ORAL DAILY
Qty: 84 TABLET | Refills: 0 | Status: SHIPPED | OUTPATIENT
Start: 2020-08-27 | End: 2020-11-18

## 2020-08-27 NOTE — TELEPHONE ENCOUNTER
Medication is being filled for 1 time refill only due to:  Patient needs to be seen because it has been more than one year since last visit.   90 day usha refill given, per [facility] refill protocol.    Pilar Dumont RN  [facility]

## 2020-11-15 ENCOUNTER — TELEPHONE (OUTPATIENT)
Dept: FAMILY MEDICINE | Facility: CLINIC | Age: 35
End: 2020-11-15

## 2020-11-15 DIAGNOSIS — L70.9 ACNE, UNSPECIFIED ACNE TYPE: ICD-10-CM

## 2020-11-18 RX ORDER — DROSPIRENONE AND ETHINYL ESTRADIOL 0.03MG-3MG
1 KIT ORAL DAILY
Qty: 28 TABLET | Refills: 0 | Status: SHIPPED | OUTPATIENT
Start: 2020-11-18 | End: 2020-12-17

## 2020-11-18 NOTE — TELEPHONE ENCOUNTER
Routing refill request to provider for review/approval because:  Sheyla given x1 and patient did not follow up, please advise    Elida Johnson RN, [facility]

## 2020-11-18 NOTE — TELEPHONE ENCOUNTER
Sheyla refill sent  Due for med check prior to further refills (virutal visit is ok)  Please send reminder

## 2020-12-15 DIAGNOSIS — L70.9 ACNE, UNSPECIFIED ACNE TYPE: ICD-10-CM

## 2020-12-17 RX ORDER — DROSPIRENONE AND ETHINYL ESTRADIOL 0.03MG-3MG
1 KIT ORAL DAILY
Qty: 28 TABLET | Refills: 0 | Status: SHIPPED | OUTPATIENT
Start: 2020-12-17 | End: 2020-12-18

## 2020-12-17 NOTE — TELEPHONE ENCOUNTER
Routing refill request to provider for review/approval because:  Drug not active on patient's medication list      Jalyn Fierro RN, BSN, PHN

## 2020-12-18 ENCOUNTER — VIRTUAL VISIT (OUTPATIENT)
Dept: PEDIATRICS | Facility: CLINIC | Age: 35
End: 2020-12-18
Payer: COMMERCIAL

## 2020-12-18 DIAGNOSIS — L70.9 ACNE, UNSPECIFIED ACNE TYPE: ICD-10-CM

## 2020-12-18 PROCEDURE — 99213 OFFICE O/P EST LOW 20 MIN: CPT | Mod: 95 | Performed by: FAMILY MEDICINE

## 2020-12-18 RX ORDER — DROSPIRENONE AND ETHINYL ESTRADIOL 0.03MG-3MG
1 KIT ORAL DAILY
Qty: 84 TABLET | Refills: 3 | Status: SHIPPED | OUTPATIENT
Start: 2020-12-18 | End: 2021-11-09

## 2020-12-18 NOTE — PROGRESS NOTES
"Beatris Santo is a 35 year old female who is being evaluated via a billable video visit.      The patient has been notified of following:     \"This video visit will be conducted via a call between you and your physician/provider. We have found that certain health care needs can be provided without the need for an in-person physical exam.  This service lets us provide the care you need with a video conversation.  If a prescription is necessary we can send it directly to your pharmacy.  If lab work is needed we can place an order for that and you can then stop by our lab to have the test done at a later time.    Video visits are billed at different rates depending on your insurance coverage.  Please reach out to your insurance provider with any questions.    If during the course of the call the physician/provider feels a video visit is not appropriate, you will not be charged for this service.\"    Patient has given verbal consent for Video visit? Yes  How would you like to obtain your AVS? MyChart  If you are dropped from the video visit, the video invite should be resent to: Text to cell phone: [phone]  Will anyone else be joining your video visit? No    Subjective     Beatris Santo is a 35 year old female who presents today via video visit for the following health issues:    HPI   Patient is here for a video visit instead of in person visit due to the current COVID-19 pandemic.  Patient is new to the provider, is here today for getting a refill on her hormonal contraception that she has been on for the last 2 years.  Patient is nulliparous, has been on the same hormonal contraception with no significant side effects  Due to pandemic, patient was not able to make the appointment with PCP for annual physicals that usually gets them in August.  Denies abnormal vaginal bleeding or other concerns  Has no personal or family history of mood disorder, migraine headaches, thromboembolism   "   Medication Followup of Birth Control    Taking Medication as prescribed: yes    Side Effects:  None            Video Start Time: 3:26pm        Review of Systems   CONSTITUTIONAL: NEGATIVE for fever, chills, change in weight  RESP: NEGATIVE for significant cough or SOB  CV: NEGATIVE for chest pain, palpitations or peripheral edema  : normal menstrual cycles  PSYCHIATRIC: NEGATIVE for changes in mood or affect      Objective           Vitals:  No vitals were obtained today due to virtual visit.    Physical Exam     GENERAL: Healthy, alert and no distress  RESP: No audible wheeze, cough, or visible cyanosis.  No visible retractions or increased work of breathing.    PSYCH: Mentation appears normal, affect normal/bright, judgement and insight intact, normal speech and appearance well-groomed.              Assessment & Plan     Acne, unspecified acne type  Refill sent  Patient will follow up with PCP next year for annual physical   Patient verbalised understanding and is agreeable to the plan.    - drospirenone-ethinyl estradiol (MAYTE) 3-0.03 MG tablet; Take 1 tablet by mouth daily        Chart documentation done in part with Dragon Voice recognition Software. Although reviewed after completion, some word and grammatical error may remain.    See Patient Instructions    No follow-ups on file.    Karon Flor MD  [facility]      Video-Visit Details    Type of service:  Video Visit    Video End Time:3;30pm    Originating Location (pt. Location): Home    Distant Location (provider location):  [facility]     Platform used for Video Visit: [employer]    Chart forwarded to PCP for FYI

## 2020-12-27 ENCOUNTER — HEALTH MAINTENANCE LETTER (OUTPATIENT)
Age: 35
End: 2020-12-27

## 2021-01-29 ENCOUNTER — OFFICE VISIT (OUTPATIENT)
Dept: FAMILY MEDICINE | Facility: CLINIC | Age: 36
End: 2021-01-29
Payer: COMMERCIAL

## 2021-01-29 VITALS
BODY MASS INDEX: 30.77 KG/M2 | OXYGEN SATURATION: 99 % | HEIGHT: 68 IN | DIASTOLIC BLOOD PRESSURE: 76 MMHG | TEMPERATURE: 98.5 F | WEIGHT: 203 LBS | SYSTOLIC BLOOD PRESSURE: 104 MMHG | HEART RATE: 66 BPM

## 2021-01-29 DIAGNOSIS — Z00.00 ROUTINE GENERAL MEDICAL EXAMINATION AT A HEALTH CARE FACILITY: Primary | ICD-10-CM

## 2021-01-29 DIAGNOSIS — Z13.1 SCREENING FOR DIABETES MELLITUS: ICD-10-CM

## 2021-01-29 DIAGNOSIS — H69.92 DYSFUNCTION OF LEFT EUSTACHIAN TUBE: ICD-10-CM

## 2021-01-29 DIAGNOSIS — Z13.220 SCREENING FOR HYPERLIPIDEMIA: ICD-10-CM

## 2021-01-29 LAB
CHOLEST SERPL-MCNC: 218 MG/DL
GLUCOSE SERPL-MCNC: 96 MG/DL (ref 70–99)
HDLC SERPL-MCNC: 67 MG/DL
LDLC SERPL CALC-MCNC: 107 MG/DL
NONHDLC SERPL-MCNC: 151 MG/DL
TRIGL SERPL-MCNC: 220 MG/DL

## 2021-01-29 PROCEDURE — 82947 ASSAY GLUCOSE BLOOD QUANT: CPT | Performed by: PHYSICIAN ASSISTANT

## 2021-01-29 PROCEDURE — 99395 PREV VISIT EST AGE 18-39: CPT | Performed by: PHYSICIAN ASSISTANT

## 2021-01-29 PROCEDURE — 80061 LIPID PANEL: CPT | Performed by: PHYSICIAN ASSISTANT

## 2021-01-29 PROCEDURE — 36415 COLL VENOUS BLD VENIPUNCTURE: CPT | Performed by: PHYSICIAN ASSISTANT

## 2021-01-29 RX ORDER — FLUTICASONE PROPIONATE 50 MCG
1 SPRAY, SUSPENSION (ML) NASAL DAILY
Qty: 16 G | Refills: 3 | Status: SHIPPED | OUTPATIENT
Start: 2021-01-29 | End: 2022-02-02

## 2021-01-29 ASSESSMENT — PAIN SCALES - GENERAL: PAINLEVEL: NO PAIN (0)

## 2021-01-29 ASSESSMENT — MIFFLIN-ST. JEOR: SCORE: 1661.36

## 2021-01-29 NOTE — PROGRESS NOTES
SUBJECTIVE:   CC: Beatris Santo is an 35 year old woman who presents for preventive health visit.       Patient has been advised of split billing requirements and indicates understanding: Yes    Healthy Habits:    Do you get at least three servings of calcium containing foods daily (dairy, green leafy vegetables, etc.)? no, taking calcium and/or vitamin D supplement: no    Amount of exercise or daily activities, outside of work: 0-1 day(s) per week    Problems taking medications regularly No    Medication side effects: No    Have you had an eye exam in the past two years? yes    Do you see a dentist twice per year? Once    Do you have sleep apnea, excessive snoring or daytime drowsiness?no        -Follow-up on ear- finished Neomycin drops but now it still feels clogged- left ear        Today's PHQ-2 Score:   PHQ-2 ( 1999 [employer]) 1/29/2021 8/2/2019   Q1: Little interest or pleasure in doing things 0 0   Q2: Feeling down, depressed or hopeless 0 0   PHQ-2 Score 0 0   Q1: Little interest or pleasure in doing things - Not at all   Q2: Feeling down, depressed or hopeless - Not at all   PHQ-2 Score - 0       Abuse: Current or Past(Physical, Sexual or Emotional)- No  Do you feel safe in your environment? Yes        Social History     Tobacco Use     Smoking status: Never Smoker     Smokeless tobacco: Never Used   Substance Use Topics     Alcohol use: Yes     Alcohol/week: 0.0 standard drinks     Comment: Socially     If you drink alcohol do you typically have >3 drinks per day or >7 drinks per week? No                     Reviewed orders with patient.  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     EYE SURGERY  2020    Lasik       Social History     Tobacco Use     Smoking status: Never Smoker     Smokeless tobacco: Never Used   Substance Use Topics     Alcohol use: Yes     Alcohol/week: 0.0 standard drinks     Comment: Socially     Family History   Problem Relation Age of Onset     Diabetes Mother      Asthma Mother      Hyperlipidemia Father      Diabetes Maternal Grandmother      Hypertension Maternal Grandmother      Breast Cancer Maternal Grandmother      Asthma Maternal Grandmother      Heart Failure Maternal Grandmother          in her 80's     Hypertension Maternal Grandfather      Colon Cancer Maternal Grandfather 86     Hypertension Paternal Grandmother      Hypertension Paternal Grandfather      Coronary Artery Disease Other         Great Grandparents (Maternal and Paternal)     Hyperlipidemia Other         Great Grandparents (Paternal)     Coronary Artery Disease Other         Great Grandparents (Maternal and Paternal)     Hyperlipidemia Other         Great Grandparents (Paternal)         Current Outpatient Medications   Medication Sig Dispense Refill     drospirenone-ethinyl estradiol (MAYTE) 3-0.03 MG tablet Take 1 tablet by mouth daily 84 tablet 3     fluticasone (FLONASE) 50 MCG/ACT nasal spray Spray 1 spray into both nostrils daily 16 g 3     neomycin-colistin-hydrocortisone-thonzonium (CORTISPORIN TC) 3.3-3-10-0.5 MG/ML otic suspension 3 drops 4 times daily         Breast CA Risk Screening:  No flowsheet data found.        Pertinent mammograms are reviewed under the imaging tab.    Pertinent mammograms are reviewed under the imaging tab.  History of abnormal Pap smear: NO - age 30-65 PAP every 5 years with negative HPV co-testing recommended  PAP / HPV Latest Ref Rng &  "Units 8/5/2019 6/21/2016   PAP - NIL NIL   HPV 16 DNA NEG:Negative Negative -   HPV 18 DNA NEG:Negative Negative -   OTHER HR HPV NEG:Negative Negative -     Reviewed and updated as needed this visit by clinical staff  Tobacco  Allergies  Meds   Med Hx  Surg Hx  Fam Hx  Soc Hx        Reviewed and updated as needed this visit by Provider  Tobacco  Allergies  Meds  Problems  Med Hx  Surg Hx  Fam Hx  Soc Hx         Had Scabs inside left ear and bled a lot.  One day really irritated and used drops for 10 days and now feels clogged.  No drainage no pain just annoying.    Need to pop ear all the time.  No nasal steroids.      Hormonal acne.  Oral contraceptive pill Better for that - occasional breakouts   Using differin every once in awhile     ROS:  CONSTITUTIONAL: NEGATIVE for fever, chills, change in weight  INTEGUMENTARU/SKIN: NEGATIVE for worrisome rashes, moles or lesions  EYES: NEGATIVE for vision changes or irritation  ENT: see hpi   RESP: NEGATIVE for significant cough or SOB  BREAST: NEGATIVE for masses, tenderness or discharge  CV: NEGATIVE for chest pain, palpitations or peripheral edema  GI: NEGATIVE for nausea, abdominal pain, heartburn, or change in bowel habits  : NEGATIVE for unusual urinary or vaginal symptoms. Periods are regular.  MUSCULOSKELETAL: NEGATIVE for significant arthralgias or myalgia  NEURO: NEGATIVE for weakness, dizziness or paresthesias  PSYCHIATRIC: NEGATIVE for changes in mood or affect    OBJECTIVE:   /76 (BP Location: Right arm, Patient Position: Sitting, Cuff Size: Adult Large)   Pulse 66   Temp 98.5  F (36.9  C) (Oral)   Ht 1.715 m (5' 7.5\")   Wt 92.1 kg (203 lb)   LMP 01/15/2021   SpO2 99%   BMI 31.33 kg/m    EXAM:  GENERAL: healthy, alert and no distress  EYES: Eyes grossly normal to inspection, PERRL and conjunctivae and sclerae normal  HENT: normal cephalic/atraumatic, left ear: normal canal, tympanic membrane retracted and dull - no erythema   Right " tympanic membrane and canal normal, nose and mouth without ulcers or lesions, oropharynx clear and oral mucous membranes moist  NECK: no adenopathy, no asymmetry, masses, or scars and thyroid normal to palpation  RESP: lungs clear to auscultation - no rales, rhonchi or wheezes  BREAST: normal without masses, tenderness or nipple discharge and no palpable axillary masses or adenopathy  CV: regular rate and rhythm, normal S1 S2, no S3 or S4, no murmur, click or rub, no peripheral edema and peripheral pulses strong  ABDOMEN: soft, nontender, no hepatosplenomegaly, no masses and bowel sounds normal   (female): normal female external genitalia, normal urethral meatus, vaginal mucosa pink, moist, well rugated, and normal cervix/adnexa/uterus without masses or discharge  MS: no gross musculoskeletal defects noted, no edema  SKIN: no suspicious lesions or rashes  NEURO: Normal strength and tone, mentation intact and speech normal  PSYCH: mentation appears normal, affect normal/bright    Diagnostic Test Results:  Results for orders placed or performed in visit on 01/29/21   Lipid panel reflex to direct LDL Fasting     Status: Abnormal   Result Value Ref Range    Cholesterol 218 (H) <200 mg/dL    Triglycerides 220 (H) <150 mg/dL    HDL Cholesterol 67 >49 mg/dL    LDL Cholesterol Calculated 107 (H) <100 mg/dL    Non HDL Cholesterol 151 (H) <130 mg/dL   Glucose     Status: None   Result Value Ref Range    Glucose 96 70 - 99 mg/dL       ASSESSMENT/PLAN:   1. Routine general medical examination at a health care facility      2. Dysfunction of left eustachian tube  Trial of flonase and follow up with ENT - may need PE tube   - fluticasone (FLONASE) 50 MCG/ACT nasal spray; Spray 1 spray into both nostrils daily  Dispense: 16 g; Refill: 3  - OTOLARYNGOLOGY REFERRAL    3. Screening for diabetes mellitus    - Glucose    4. Screening for hyperlipidemia    - Lipid panel reflex to direct LDL Fasting    Patient has been advised of split  "billing requirements and indicates understanding: Yes  COUNSELING:   Reviewed preventive health counseling, as reflected in patient instructions       Regular exercise       Healthy diet/nutrition       Vision screening       Contraception       Safe sex practices/STD prevention    Estimated body mass index is 31.33 kg/m  as calculated from the following:    Height as of this encounter: 1.715 m (5' 7.5\").    Weight as of this encounter: 92.1 kg (203 lb).    Weight management plan: Discussed healthy diet and exercise guidelines    She reports that she has never smoked. She has never used smokeless tobacco.      Counseling Resources:  ATP IV Guidelines  Pooled Cohorts Equation Calculator  Breast Cancer Risk Calculator  BRCA-Related Cancer Risk Assessment: FHS-7 Tool  FRAX Risk Assessment  ICSI Preventive Guidelines  Dietary Guidelines for Americans, 2010  USDA's MyPlate  ASA Prophylaxis  Lung CA Screening    Smita Galicia PA-C  [facility]  "

## 2021-01-29 NOTE — RESULT ENCOUNTER NOTE
Mojgan Spear  Your total cholesterol and triglycerides were above normal.  Poor diet, genetics and being overweight can contribute to this.  Maintaining a healthy diet with lean proteins, whole grains and healthy fats such as olive oil as well as regular exercise and maintaining an appropriate weight all contribute to healthier cholesterol levels.    Your blood sugar was normal.   Please call or MyChart my office with any questions or concerns.    Smita Galicia, PAC

## 2021-01-29 NOTE — PATIENT INSTRUCTIONS
Use flonase nasal spray 2 sprays each nostril daily  Follow up with ENT if no improvement in symptoms over the next week.   Return urgently if any change in symptoms.   I will notify you of lab results via [employer]    Patient Education     Preventive Health Recommendations  Female Ages 26 - 39  Yearly exam:   See your health care provider every year in order to    Review health changes.     Discuss preventive care.      Review your medicines if you your doctor has prescribed any.    Until age 30: Get a Pap test every three years (more often if you have had an abnormal result).    After age 30: Talk to your doctor about whether you should have a Pap test every 3 years or have a Pap test with HPV screening every 5 years.   You do not need a Pap test if your uterus was removed (hysterectomy) and you have not had cancer.  You should be tested each year for STDs (sexually transmitted diseases), if you're at risk.   Talk to your provider about how often to have your cholesterol checked.  If you are at risk for diabetes, you should have a diabetes test (fasting glucose).  Shots: Get a flu shot each year. Get a tetanus shot every 10 years.   Nutrition:     Eat at least 5 servings of fruits and vegetables each day.    Eat whole-grain bread, whole-wheat pasta and brown rice instead of white grains and rice.    Get adequate Calcium and Vitamin D.     Lifestyle    Exercise at least 150 minutes a week (30 minutes a day, 5 days of the week). This will help you control your weight and prevent disease.    Limit alcohol to one drink per day.    No smoking.     Wear sunscreen to prevent skin cancer.    See your dentist every six months for an exam and cleaning.

## 2021-02-16 ENCOUNTER — OFFICE VISIT (OUTPATIENT)
Dept: AUDIOLOGY | Facility: CLINIC | Age: 36
End: 2021-02-16
Payer: COMMERCIAL

## 2021-02-16 ENCOUNTER — OFFICE VISIT (OUTPATIENT)
Dept: OTOLARYNGOLOGY | Facility: CLINIC | Age: 36
End: 2021-02-16
Payer: COMMERCIAL

## 2021-02-16 VITALS — HEART RATE: 96 BPM | DIASTOLIC BLOOD PRESSURE: 87 MMHG | OXYGEN SATURATION: 96 % | SYSTOLIC BLOOD PRESSURE: 133 MMHG

## 2021-02-16 DIAGNOSIS — H93.292 ABNORMAL AUDITORY PERCEPTION OF LEFT EAR: Primary | ICD-10-CM

## 2021-02-16 DIAGNOSIS — H61.22 IMPACTED CERUMEN OF LEFT EAR: Primary | ICD-10-CM

## 2021-02-16 PROCEDURE — 99202 OFFICE O/P NEW SF 15 MIN: CPT | Performed by: OTOLARYNGOLOGY

## 2021-02-16 PROCEDURE — 92567 TYMPANOMETRY: CPT | Performed by: AUDIOLOGIST

## 2021-02-16 ASSESSMENT — ENCOUNTER SYMPTOMS
HEARTBURN: 0
HEMOPTYSIS: 0
VOMITING: 0
HEADACHES: 0
CONSTITUTIONAL NEGATIVE: 1
DIZZINESS: 0
DOUBLE VISION: 0
BRUISES/BLEEDS EASILY: 0
SPUTUM PRODUCTION: 0
TINGLING: 0
SORE THROAT: 0
STRIDOR: 0
COUGH: 0
NAUSEA: 0
BLURRED VISION: 0

## 2021-02-16 NOTE — LETTER
2/16/2021         RE: Beatris Santo  [address]  [facility] [address]        Dear Colleague,    Thank you for referring your patient, Beatris Santo, to the [facility]. Please see a copy of my visit note below.    HPI    This pleasant patient is having crackling sound in her left ear for the past several weeks. Denies any tinnitus, vertigo, or ear drainage. She was given some ear drops with no change. She has seasonal allergies.    Review of Systems   Constitutional: Negative.    HENT: Positive for congestion and hearing loss. Negative for ear discharge, ear pain, nosebleeds, sore throat and tinnitus.    Eyes: Negative for blurred vision and double vision.   Respiratory: Negative for cough, hemoptysis, sputum production and stridor.    Gastrointestinal: Negative for heartburn, nausea and vomiting.   Skin: Negative.    Neurological: Negative for dizziness, tingling and headaches.   Endo/Heme/Allergies: Negative for environmental allergies. Does not bruise/bleed easily.         Physical Exam  Vitals signs reviewed.   Constitutional:       Appearance: Normal appearance. She is normal weight.   HENT:      Head: Normocephalic.      Right Ear: Hearing, tympanic membrane, ear canal and external ear normal. No decreased hearing noted. No middle ear effusion. There is no impacted cerumen.      Left Ear: Hearing, tympanic membrane, ear canal and external ear normal. No decreased hearing noted.  No middle ear effusion. There is impacted cerumen.      Nose: Nose normal. No congestion or rhinorrhea.      Mouth/Throat:      Mouth: Mucous membranes are moist.      Pharynx: Oropharynx is clear. Uvula midline.   Eyes:      Extraocular Movements: Extraocular movements intact.   Neurological:      Mental Status: She is alert.       Left ear canal: A thin film covering the TM; removed with a curette and alligator. Ear drum appeared to be intact.    A/P  This pleasant patient  is having ear wax in her left ear. A thin film covering the TM; removed with a curette and alligator. Ear drum appeared to be intact. Tymps: WNLs. F/u as needed.          Again, thank you for allowing me to participate in the care of your patient.        Sincerely,        Timothy Hidalgo MD

## 2021-02-16 NOTE — PROGRESS NOTES
HPI    This pleasant patient is having crackling sound in her left ear for the past several weeks. Denies any tinnitus, vertigo, or ear drainage. She was given some ear drops with no change. She has seasonal allergies.    Review of Systems   Constitutional: Negative.    HENT: Positive for congestion and hearing loss. Negative for ear discharge, ear pain, nosebleeds, sore throat and tinnitus.    Eyes: Negative for blurred vision and double vision.   Respiratory: Negative for cough, hemoptysis, sputum production and stridor.    Gastrointestinal: Negative for heartburn, nausea and vomiting.   Skin: Negative.    Neurological: Negative for dizziness, tingling and headaches.   Endo/Heme/Allergies: Negative for environmental allergies. Does not bruise/bleed easily.         Physical Exam  Vitals signs reviewed.   Constitutional:       Appearance: Normal appearance. She is normal weight.   HENT:      Head: Normocephalic.      Right Ear: Hearing, tympanic membrane, ear canal and external ear normal. No decreased hearing noted. No middle ear effusion. There is no impacted cerumen.      Left Ear: Hearing, tympanic membrane, ear canal and external ear normal. No decreased hearing noted.  No middle ear effusion. There is impacted cerumen.      Nose: Nose normal. No congestion or rhinorrhea.      Mouth/Throat:      Mouth: Mucous membranes are moist.      Pharynx: Oropharynx is clear. Uvula midline.   Eyes:      Extraocular Movements: Extraocular movements intact.   Neurological:      Mental Status: She is alert.       Left ear canal: A thin film covering the TM; removed with a curette and alligator. Ear drum appeared to be intact.    A/P  This pleasant patient is having ear wax in her left ear. A thin film covering the TM; removed with a curette and alligator. Ear drum appeared to be intact. Tymps: WNLs. F/u as needed.

## 2021-02-16 NOTE — PROGRESS NOTES
AUDIOLOGY REPORT    SUMMARY: Audiology visit completed. See audiogram for results.    RECOMMENDATIONS: Follow-up with ENT.    Teresa Vidales  Doctor of Audiology  [place] License # [id number]

## 2021-02-16 NOTE — NURSING NOTE
Beatris Santo's goals for this visit include:   Chief Complaint   Patient presents with     Ear Problem     Dysfuction of left eustachian tube, hearing is muffled. Onset about 1 month. Has tried flonase, no help. started some nose bleeds.        She requests these members of her care team be copied on today's visit information: yes    PCP: Ana Luisa Bartlett    Referring Provider:  Smita Galicia PA-C  [address] [place] [address]    /87   Pulse 96   SpO2 96%     Do you need any medication refills at today's visit? none

## 2021-10-09 ENCOUNTER — HEALTH MAINTENANCE LETTER (OUTPATIENT)
Age: 36
End: 2021-10-09

## 2021-10-19 NOTE — NURSING NOTE
"Chief Complaint   Patient presents with     URI     sore throat, coughing, SOB x 5 days       Initial /70 (BP Location: Right arm, Patient Position: Sitting, Cuff Size: Adult Regular)  Pulse 86  Temp 98.4  F (36.9  C) (Temporal)  Wt 199 lb 3.2 oz (90.4 kg)  LMP 08/10/2018  SpO2 98%  Breastfeeding? No  BMI 31.2 kg/m2 Estimated body mass index is 31.2 kg/(m^2) as calculated from the following:    Height as of 8/2/18: 5' 7\" (1.702 m).    Weight as of this encounter: 199 lb 3.2 oz (90.4 kg).  Medication Reconciliation: complete      SHAUNNA Bravo      " Heart Failure/Stroke/Apixaban/Eliquis

## 2021-11-06 DIAGNOSIS — L70.9 ACNE, UNSPECIFIED ACNE TYPE: ICD-10-CM

## 2021-11-09 RX ORDER — DROSPIRENONE AND ETHINYL ESTRADIOL 0.03MG-3MG
KIT ORAL
Qty: 84 TABLET | Refills: 0 | Status: SHIPPED | OUTPATIENT
Start: 2021-11-09 | End: 2022-01-28

## 2021-11-09 NOTE — TELEPHONE ENCOUNTER
Medication is being filled for 1 time refill only due to:  Patient will be due for visit in January, annual physical   90 day supply usha refill given    Pilar Dumont RN  [facility]

## 2022-01-30 ASSESSMENT — ENCOUNTER SYMPTOMS
PALPITATIONS: 0
HEMATOCHEZIA: 0
PARESTHESIAS: 0
BREAST MASS: 0
CONSTIPATION: 0
COUGH: 0
MYALGIAS: 0
SHORTNESS OF BREATH: 0
HEADACHES: 0
FEVER: 0
ABDOMINAL PAIN: 0
SORE THROAT: 0
CHILLS: 0
EYE PAIN: 0
FREQUENCY: 0
NAUSEA: 0
DIZZINESS: 0
HEMATURIA: 0
HEARTBURN: 0
DIARRHEA: 0
WEAKNESS: 0
NERVOUS/ANXIOUS: 0
ARTHRALGIAS: 0
JOINT SWELLING: 0
DYSURIA: 0

## 2022-02-02 ENCOUNTER — OFFICE VISIT (OUTPATIENT)
Dept: FAMILY MEDICINE | Facility: CLINIC | Age: 37
End: 2022-02-02
Payer: COMMERCIAL

## 2022-02-02 VITALS
OXYGEN SATURATION: 97 % | BODY MASS INDEX: 31.6 KG/M2 | HEIGHT: 67 IN | SYSTOLIC BLOOD PRESSURE: 139 MMHG | TEMPERATURE: 98.5 F | HEART RATE: 74 BPM | WEIGHT: 201.3 LBS | DIASTOLIC BLOOD PRESSURE: 86 MMHG

## 2022-02-02 DIAGNOSIS — Z13.220 SCREENING FOR HYPERLIPIDEMIA: ICD-10-CM

## 2022-02-02 DIAGNOSIS — Z00.00 ROUTINE GENERAL MEDICAL EXAMINATION AT A HEALTH CARE FACILITY: Primary | ICD-10-CM

## 2022-02-02 DIAGNOSIS — Z13.1 SCREENING FOR DIABETES MELLITUS (DM): ICD-10-CM

## 2022-02-02 DIAGNOSIS — Z13.0 SCREENING FOR DEFICIENCY ANEMIA: ICD-10-CM

## 2022-02-02 DIAGNOSIS — L70.9 ACNE, UNSPECIFIED ACNE TYPE: ICD-10-CM

## 2022-02-02 LAB
CHOLEST SERPL-MCNC: 207 MG/DL
ERYTHROCYTE [DISTWIDTH] IN BLOOD BY AUTOMATED COUNT: 12.1 % (ref 10–15)
FASTING STATUS PATIENT QL REPORTED: YES
FASTING STATUS PATIENT QL REPORTED: YES
GLUCOSE BLD-MCNC: 96 MG/DL (ref 70–99)
HCT VFR BLD AUTO: 39.3 % (ref 35–47)
HDLC SERPL-MCNC: 65 MG/DL
HGB BLD-MCNC: 12.9 G/DL (ref 11.7–15.7)
LDLC SERPL CALC-MCNC: 97 MG/DL
MCH RBC QN AUTO: 29.4 PG (ref 26.5–33)
MCHC RBC AUTO-ENTMCNC: 32.8 G/DL (ref 31.5–36.5)
MCV RBC AUTO: 90 FL (ref 78–100)
NONHDLC SERPL-MCNC: 142 MG/DL
PLATELET # BLD AUTO: 319 10E3/UL (ref 150–450)
RBC # BLD AUTO: 4.39 10E6/UL (ref 3.8–5.2)
TRIGL SERPL-MCNC: 227 MG/DL
WBC # BLD AUTO: 8.5 10E3/UL (ref 4–11)

## 2022-02-02 PROCEDURE — 99395 PREV VISIT EST AGE 18-39: CPT | Performed by: FAMILY MEDICINE

## 2022-02-02 PROCEDURE — 82947 ASSAY GLUCOSE BLOOD QUANT: CPT | Performed by: FAMILY MEDICINE

## 2022-02-02 PROCEDURE — 80061 LIPID PANEL: CPT | Performed by: FAMILY MEDICINE

## 2022-02-02 PROCEDURE — 85027 COMPLETE CBC AUTOMATED: CPT | Performed by: FAMILY MEDICINE

## 2022-02-02 PROCEDURE — 36415 COLL VENOUS BLD VENIPUNCTURE: CPT | Performed by: FAMILY MEDICINE

## 2022-02-02 RX ORDER — DROSPIRENONE AND ETHINYL ESTRADIOL 0.03MG-3MG
1 KIT ORAL DAILY
Qty: 84 TABLET | Refills: 3 | Status: SHIPPED | OUTPATIENT
Start: 2022-02-02 | End: 2022-12-21

## 2022-02-02 ASSESSMENT — ENCOUNTER SYMPTOMS
COUGH: 0
CHILLS: 0
HEMATURIA: 0
SORE THROAT: 0
PARESTHESIAS: 0
CONSTIPATION: 0
MYALGIAS: 0
FREQUENCY: 0
PALPITATIONS: 0
HEMATOCHEZIA: 0
DIARRHEA: 0
DIZZINESS: 0
EYE PAIN: 0
SHORTNESS OF BREATH: 0
ARTHRALGIAS: 0
DYSURIA: 0
JOINT SWELLING: 0
FEVER: 0
WEAKNESS: 0
NERVOUS/ANXIOUS: 0
HEARTBURN: 0
NAUSEA: 0
BREAST MASS: 0
HEADACHES: 0
ABDOMINAL PAIN: 0

## 2022-02-02 ASSESSMENT — MIFFLIN-ST. JEOR: SCORE: 1640.72

## 2022-02-02 NOTE — PROGRESS NOTES
"  Assessment & Plan     Routine general medical examination at a health care facility  Discussed on regular exercises, healthy eating, self breast exams monthly and routine dental checks.      Acne, unspecified acne type    - drospirenone-ethinyl estradiol (MAYTE) 3-0.03 MG tablet; Take 1 tablet by mouth daily    Screening for deficiency anemia    - CBC with platelets; Future  - CBC with platelets    Screening for diabetes mellitus (DM)    - Glucose; Future  - Glucose    Screening for hyperlipidemia    - Lipid panel reflex to direct LDL Fasting; Future  - Lipid panel reflex to direct LDL Fasting             BMI:   Estimated body mass index is 31.53 kg/m  as calculated from the following:    Height as of this encounter: 1.702 m (5' 7\").    Weight as of this encounter: 91.3 kg (201 lb 4.8 oz).   Weight management plan: Discussed healthy diet and exercise guidelines    Work on weight loss  Regular exercise  Chart documentation done in part with Dragon Voice recognition Software. Although reviewed after completion, some word and grammatical error may remain.    See Patient Instructions    Return in about 1 year (around 2/2/2023), or if symptoms worsen or fail to improve, for Physical Exam.    Karon Flor MD  [facility]    Crystal Spear is a 36 year old who presents for the following health issues     Healthy Habits:     Getting at least 3 servings of Calcium per day:  NO    Bi-annual eye exam:  Yes    Dental care twice a year:  Yes    Sleep apnea or symptoms of sleep apnea:  None    Diet:  Regular (no restrictions)    Frequency of exercise:  2-3 days/week    Duration of exercise:  30-45 minutes    Taking medications regularly:  Yes    Medication side effects:  None    PHQ-2 Total Score: 0    Additional concerns today:  No             Review of Systems   Constitutional: Negative for chills and fever.   HENT: Negative for congestion, ear pain, hearing loss and sore throat.    Eyes: " "Negative for pain and visual disturbance.   Respiratory: Negative for cough and shortness of breath.    Cardiovascular: Negative for chest pain, palpitations and peripheral edema.   Gastrointestinal: Negative for abdominal pain, constipation, diarrhea, heartburn, hematochezia and nausea.   Breasts:  Negative for tenderness, breast mass and discharge.   Genitourinary: Negative for dysuria, frequency, genital sores, hematuria, pelvic pain, urgency, vaginal bleeding and vaginal discharge.   Musculoskeletal: Negative for arthralgias, joint swelling and myalgias.   Skin: Negative for rash.   Neurological: Negative for dizziness, weakness, headaches and paresthesias.   Psychiatric/Behavioral: Negative for mood changes. The patient is not nervous/anxious.       CONSTITUTIONAL: NEGATIVE for fever, chills, change in weight  INTEGUMENTARY/SKIN: NEGATIVE for worrisome rashes, moles or lesions  H/o acne  EYES: NEGATIVE for vision changes or irritation  ENT/MOUTH: NEGATIVE for ear, mouth and throat problems  RESP: NEGATIVE for significant cough or SOB  BREAST: NEGATIVE for masses, tenderness or discharge  CV: NEGATIVE for chest pain, palpitations or peripheral edema  GI: NEGATIVE for nausea, abdominal pain, heartburn, or change in bowel habits  : normal menstrual cycles  MUSCULOSKELETAL: NEGATIVE for significant arthralgias or myalgia  NEURO: NEGATIVE for weakness, dizziness or paresthesias  ENDOCRINE: NEGATIVE for temperature intolerance, skin/hair changes  HEME/ALLERGY/IMMUNE: NEGATIVE for bleeding problems  PSYCHIATRIC: NEGATIVE for changes in mood or affect      Objective    /86 (BP Location: Left arm, Patient Position: Sitting, Cuff Size: Adult Large)   Pulse 74   Temp 98.5  F (36.9  C) (Axillary)   Ht 1.702 m (5' 7\")   Wt 91.3 kg (201 lb 4.8 oz)   LMP 02/02/2022 (Exact Date)   SpO2 97%   BMI 31.53 kg/m    Body mass index is 31.53 kg/m .  Physical Exam   GENERAL: healthy, alert and no distress  EYES: Eyes " grossly normal to inspection, PERRL and conjunctivae and sclerae normal  HENT: ear canals and TM's normal, nose and mouth without ulcers or lesions  NECK: no adenopathy, no asymmetry, masses, or scars and thyroid normal to palpation  RESP: lungs clear to auscultation - no rales, rhonchi or wheezes  BREAST: normal without masses, tenderness or nipple discharge and no palpable axillary masses or adenopathy  CV: regular rate and rhythm, normal S1 S2, no S3 or S4, no murmur, click or rub, no peripheral edema and peripheral pulses strong  ABDOMEN: soft, nontender, no hepatosplenomegaly, no masses and bowel sounds normal  MS: no gross musculoskeletal defects noted, no edema  SKIN: no suspicious lesions or rashes  NEURO: Normal strength and tone, mentation intact and speech normal  PSYCH: mentation appears normal, affect normal/bright

## 2022-02-02 NOTE — PROGRESS NOTES
SUBJECTIVE:   CC: Beatris Santo is an 36 year old woman who presents for preventive health visit.     {Split Bill scripting  The purpose of this visit is to discuss your medical history and prevent health problems before you are sick. You may be responsible for a co-pay, coinsurance, or deductible if your visit today includes services such as checking on a sore throat, having an x-ray or lab test, or treating and evaluating a new or existing condition :504096}  {Patient advised of split billing (Optional):935772}  HPI  {Add if <65 person on Medicare  - Required Questions (Optional):101270}  {Outside tests to abstract? :753944}    {additional problems to add (Optional):845086}    Today's PHQ-2 Score:   PHQ-2 ( 1999 [employer]) 1/30/2022   Q1: Little interest or pleasure in doing things 0   Q2: Feeling down, depressed or hopeless 0   PHQ-2 Score 0   PHQ-2 Total Score (12-17 Years)- Positive if 3 or more points; Administer PHQ-A if positive -   Q1: Little interest or pleasure in doing things Not at all   Q2: Feeling down, depressed or hopeless Not at all   PHQ-2 Score 0       Abuse: Current or Past (Physical, Sexual or Emotional) - { :638144}  Do you feel safe in your environment? { :107615}    Have you ever done Advance Care Planning? (For example, a Health Directive, POLST, or a discussion with a medical provider or your loved ones about your wishes): { :548819}    Social History     Tobacco Use     Smoking status: Never Smoker     Smokeless tobacco: Never Used   Substance Use Topics     Alcohol use: Yes     Alcohol/week: 0.0 standard drinks     Comment: Socially     {Rooming Staff- Complete this question if Prescreen response is not shown below for today's visit. If you drink alcohol do you typically have >3 drinks per day or >7 drinks per week? (Optional):979589}    Alcohol Use 1/30/2022   Prescreen: >3 drinks/day or >7 drinks/week? No   Prescreen: >3 drinks/day or >7 drinks/week? -   {add AUDIT  "responses (Optional) (A score of 7 for adult men is an indication of hazardous drinking; a score of 8 or more is an indication of an alcohol use disorder.  A score of 7 or more for adult women is an indication of hazardous drinking or an alchohol use disorder):461090}    Reviewed orders with patient.  Reviewed health maintenance and updated orders accordingly - { :211561::\"Yes\"}  {Chronicprobdata (optional):530036}    Breast Cancer Screening:    FHS-7:   Breast CA Risk Assessment (FHS-7) 1/30/2022   Did any of your first-degree relatives have breast or ovarian cancer? No   Did any of your relatives have bilateral breast cancer? No   Did any man in your family have breast cancer? No   Did any woman in your family have breast and ovarian cancer? Yes   Did any woman in your family have breast cancer before age 50 y? No   Do you have 2 or more relatives with breast and/or ovarian cancer? No   Do you have 2 or more relatives with breast and/or bowel cancer? No     {If any of the questions to the BCRA (FHS-7) are answered yes, consider ordering referral for genetic counseling (Optional) :025188::\"click delete button to remove this line now\"}  {AMB Mammogram Decision Support (Optional) :137413}  Pertinent mammograms are reviewed under the imaging tab.    History of abnormal Pap smear: { :809820}  PAP / HPV Latest Ref Rng & Units 8/5/2019 6/21/2016   PAP (Historical) - NIL NIL   HPV16 NEG:Negative Negative -   HPV18 NEG:Negative Negative -   HRHPV NEG:Negative Negative -     Reviewed and updated as needed this visit by clinical staff  Tobacco  Allergies  Meds   Med Hx  Surg Hx  Fam Hx  Soc Hx       Reviewed and updated as needed this visit by Provider               {HISTORY OPTIONS (Optional):733386}    Review of Systems  {FEMALE ROS (Optional):585281}     OBJECTIVE:   /86 (BP Location: Left arm, Patient Position: Sitting, Cuff Size: Adult Large)   Pulse 74   Temp 98.5  F (36.9  C) (Axillary)   Ht 1.702 m (5' " "7\")   Wt 91.3 kg (201 lb 4.8 oz)   LMP 02/02/2022 (Exact Date)   SpO2 97%   BMI 31.53 kg/m    Physical Exam  {Exam Choices (Optional):637001}    {Diagnostic Test Results (Optional):344415::\"Diagnostic Test Results:\",\"Labs reviewed in Epic\"}    ASSESSMENT/PLAN:   {Diag Picklist:066173}    {Patient advised of split billing (Optional):485937}    COUNSELING:  {FEMALE COUNSELING MESSAGES:504297::\"Reviewed preventive health counseling, as reflected in patient instructions\"}    Estimated body mass index is 31.53 kg/m  as calculated from the following:    Height as of this encounter: 1.702 m (5' 7\").    Weight as of this encounter: 91.3 kg (201 lb 4.8 oz).    {Weight Management Plan (ACO) Complete if BMI is abnormal-  Ages 18-64  BMI >24.9.  Age 65+ with BMI <23 or >30 (Optional):409163}    She reports that she has never smoked. She has never used smokeless tobacco.      Counseling Resources:  ATP IV Guidelines  Pooled Cohorts Equation Calculator  Breast Cancer Risk Calculator  BRCA-Related Cancer Risk Assessment: FHS-7 Tool  FRAX Risk Assessment  ICSI Preventive Guidelines  Dietary Guidelines for Americans, 2010  USDA's MyPlate  ASA Prophylaxis  Lung CA Screening    Karon Flor MD  [facility]  "

## 2022-02-02 NOTE — RESULT ENCOUNTER NOTE
Dear Beatris,  Your lab results showed normal fasting blood sugar with no concern for diabetes and normal hemoglobin with no anemia.  Your fasting cholesterol numbers showed normal good and bad cholesterol but slightly elevated triglycerides and total cholesterol.   Desired or goal levels are:  TOTAL CHOLESTEROL: Desirable is less than 200.   HDL (Good Cholesterol): Desirable is greater than 40 (for men) greater than 50 (for women).  LDL (Bad Cholesterol): Desirable is less than 130 (or less than 100 if you have heart disease or diabetes). Borderline 130-160.  TRIGLYCERIDES: Desirable is less than 150.  Borderline is 150-200..    As you may know, an elevated cholesterol is one factor that increases your risk for heart disease and stroke. You can improve your cholesterol by controlling the amount and type of fat you eat and by increasing your daily activity level.  Here are some ways to improve your nutrition:  Eat less fat (especially butter, Crisco and other saturated fats)  Buy lean cuts of meat, reduce your portions of red meat or substitute poultry or fish  Use skim milk and low-fat dairy products  Eat no more than 4 egg yolks per week  Avoid fried or fast foods that are high in fat  Eat more fruits and vegetables  Also consider starting or increasing your aerobic activity. Aerobic activity is the best way to improve HDL (good) cholesterol.    Let me know if you have any questions. Take care.  Karon Flor MD

## 2022-09-11 ENCOUNTER — HEALTH MAINTENANCE LETTER (OUTPATIENT)
Age: 37
End: 2022-09-11

## 2022-10-20 ENCOUNTER — OFFICE VISIT (OUTPATIENT)
Dept: FAMILY MEDICINE | Facility: CLINIC | Age: 37
End: 2022-10-20
Payer: OTHER MISCELLANEOUS

## 2022-10-20 VITALS
HEART RATE: 68 BPM | SYSTOLIC BLOOD PRESSURE: 122 MMHG | RESPIRATION RATE: 14 BRPM | TEMPERATURE: 98.3 F | OXYGEN SATURATION: 98 % | BODY MASS INDEX: 29.86 KG/M2 | DIASTOLIC BLOOD PRESSURE: 80 MMHG | WEIGHT: 185.8 LBS | HEIGHT: 66 IN

## 2022-10-20 DIAGNOSIS — M77.11 RIGHT LATERAL EPICONDYLITIS: ICD-10-CM

## 2022-10-20 DIAGNOSIS — M77.8 TENDONITIS OF WRIST, RIGHT: Primary | ICD-10-CM

## 2022-10-20 PROCEDURE — 99213 OFFICE O/P EST LOW 20 MIN: CPT | Performed by: INTERNAL MEDICINE

## 2022-10-20 ASSESSMENT — PAIN SCALES - GENERAL: PAINLEVEL: NO PAIN (0)

## 2022-10-20 NOTE — PROGRESS NOTES
"  Assessment & Plan     1.  Tendinitis of the right wrist.  Obviously related to overuse at work.  A letter provided for work to restrict her from using  for 4 weeks.  Patient demonstrated exercises she can do at home.  2.  Right lateral epicondylitis mild.  Demonstrated exercises she could do at home.       BMI:   Estimated body mass index is 29.99 kg/m  as calculated from the following:    Height as of this encounter: 1.676 m (5' 6\").    Weight as of this encounter: 84.3 kg (185 lb 12.8 oz).         Return in about 2 months (around 12/20/2022) for prn.    Darci Barney MD  [facility] VERONICA Spear is a 37 year old, presenting for the following health issues:  Pain (Right wrist pain and numbness in fingers)      Pain    History of Present Illness       Reason for visit:  Wrist pain performing work extractions  Symptom onset:  More than a month  Symptoms include:  Finger numbness and wrist pain  Symptom intensity:  Mild  Symptom progression:  Staying the same  Had these symptoms before:  No  What makes it worse:  Performing  cutting extractions  What makes it better:  Not performing those type of cuts    She eats 2-3 servings of fruits and vegetables daily.She consumes 1 sweetened beverage(s) daily.She exercises with enough effort to increase her heart rate 60 or more minutes per day.  She exercises with enough effort to increase her heart rate 4 days per week.   She is taking medications regularly.     37-year-old young lady who exercises regularly in the gym including weight to his exercise presents with pain of the right wrist and also around the right lateral elbow.  She started noticing the symptoms after she started using a new  at work for about a month.  She is not experiencing pain and even numbness of the hand when cutting.  She has noticed with certain movement she would get pain left laterally around the left elbow.  She has not noticed " "any joint swelling.    Review of Systems   Constitutional, HEENT, cardiovascular, pulmonary, GI, , musculoskeletal, neuro, skin, endocrine and psych systems are negative, except as otherwise noted.      Objective    /80   Pulse 68   Temp 98.3  F (36.8  C) (Oral)   Resp 14   Ht 1.676 m (5' 6\")   Wt 84.3 kg (185 lb 12.8 oz)   LMP 09/28/2022 (Exact Date)   SpO2 98%   BMI 29.99 kg/m    Body mass index is 29.99 kg/m .  Physical Exam   GENERAL: healthy, alert and no distress  MS: Right upper extremity examination reveals no swelling.  Hand examination is normal.  Right wrist examination reveals normal range of motion and no tenderness.  Along the lateral aspect of the wrist along the extensor tendon of the right thumb patient has tenderness on palpation consistent with tendinitis.  Right elbow examination revealed tenderness over the lateral epicondyle.  Range of motion of the elbow is normal.  Rest of exam normal.                    "

## 2022-10-20 NOTE — LETTER
October 20, 2022      Beatris Santo  [address]  [place] [address]        To Whom It May Concern:    Beatris Santo was seen in our clinic. 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    Sincerely,        Darci Barney MD

## 2022-10-31 ENCOUNTER — MYC MEDICAL ADVICE (OUTPATIENT)
Dept: FAMILY MEDICINE | Facility: CLINIC | Age: 37
End: 2022-10-31

## 2022-10-31 NOTE — TELEPHONE ENCOUNTER
Routing to provider to review and advise, last visit 10/20/2022.    Mildred Stern RN    [facility]

## 2022-12-21 DIAGNOSIS — L70.9 ACNE, UNSPECIFIED ACNE TYPE: ICD-10-CM

## 2022-12-21 RX ORDER — DROSPIRENONE AND ETHINYL ESTRADIOL 0.03MG-3MG
KIT ORAL
Qty: 84 TABLET | Refills: 3 | OUTPATIENT
Start: 2022-12-21

## 2022-12-21 NOTE — TELEPHONE ENCOUNTER
Refill request too soon. Refused.     Maureen Bolden, RN, BSN, PHN  [facility]: [place]

## 2022-12-29 ENCOUNTER — OFFICE VISIT (OUTPATIENT)
Dept: FAMILY MEDICINE | Facility: CLINIC | Age: 37
End: 2022-12-29
Payer: OTHER MISCELLANEOUS

## 2022-12-29 VITALS
RESPIRATION RATE: 12 BRPM | WEIGHT: 189.38 LBS | HEART RATE: 73 BPM | DIASTOLIC BLOOD PRESSURE: 88 MMHG | BODY MASS INDEX: 29.72 KG/M2 | OXYGEN SATURATION: 98 % | HEIGHT: 67 IN | SYSTOLIC BLOOD PRESSURE: 135 MMHG | TEMPERATURE: 97.4 F

## 2022-12-29 DIAGNOSIS — M77.11 LATERAL EPICONDYLITIS OF RIGHT ELBOW: Primary | ICD-10-CM

## 2022-12-29 PROCEDURE — 99214 OFFICE O/P EST MOD 30 MIN: CPT | Performed by: INTERNAL MEDICINE

## 2022-12-29 ASSESSMENT — PAIN SCALES - GENERAL: PAINLEVEL: MILD PAIN (2)

## 2022-12-29 NOTE — PROGRESS NOTES
"  Assessment & Plan     1.  Lateral epicondylitis of the right elbow.  Symptoms persisting.  Advised not to use the  at work and start physical therapy for epicondylitis.  Follow-up in about 9 weeks.  A letter for work restriction given.       BMI:   Estimated body mass index is 29.66 kg/m  as calculated from the following:    Height as of this encounter: 1.702 m (5' 7\").    Weight as of this encounter: 85.9 kg (189 lb 6 oz).         Return in about 9 weeks (around 3/2/2023) for Routine Visit.    Darci Barney MD  [facility] VERONICA Spear is a 37 year old, presenting for the following health issues:  Tendonitis      History of Present Illness       Reason for visit:  Follow up on tendonitis - workers comp    She eats 2-3 servings of fruits and vegetables daily.She consumes 1 sweetened beverage(s) daily.She exercises with enough effort to increase her heart rate 30 to 60 minutes per day.  She exercises with enough effort to increase her heart rate 4 days per week.   She is taking medications regularly.     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  She does not have to do as much as she did before.  But still quite bothersome and painful.  She has been doing some home exercises that I had demonstrated.    Review of Systems   Constitutional, HEENT, cardiovascular, pulmonary, GI, , musculoskeletal, neuro, skin, endocrine and psych systems are negative, except as otherwise noted.      Objective    There were no vitals taken for this visit.  There is no height or weight on file to calculate BMI.  Physical Exam   GENERAL: healthy, alert and no distress  MS: Examination reveals good range of motion.  There is no redness or swelling.  Tenderness of the lateral epicondyle.                    "

## 2022-12-29 NOTE — LETTER
December 29, 2022      Beatris Santo  [address]  [place] [address]        To Whom It May Concern:    Beatris Santo was seen in our clinic. Advice work restriction for next 8 weeks. 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       Sincerely,        Darci Barney MD

## 2023-01-02 ENCOUNTER — THERAPY VISIT (OUTPATIENT)
Dept: PHYSICAL THERAPY | Facility: CLINIC | Age: 38
End: 2023-01-02
Attending: INTERNAL MEDICINE
Payer: OTHER MISCELLANEOUS

## 2023-01-02 DIAGNOSIS — M77.11 LATERAL EPICONDYLITIS OF RIGHT ELBOW: ICD-10-CM

## 2023-01-02 DIAGNOSIS — M25.521 RIGHT ELBOW PAIN: ICD-10-CM

## 2023-01-02 PROCEDURE — 97110 THERAPEUTIC EXERCISES: CPT | Mod: GP | Performed by: PHYSICAL THERAPIST

## 2023-01-02 PROCEDURE — 97161 PT EVAL LOW COMPLEX 20 MIN: CPT | Mod: GP | Performed by: PHYSICAL THERAPIST

## 2023-01-02 PROCEDURE — 97530 THERAPEUTIC ACTIVITIES: CPT | Mod: GP | Performed by: PHYSICAL THERAPIST

## 2023-01-11 ENCOUNTER — THERAPY VISIT (OUTPATIENT)
Dept: PHYSICAL THERAPY | Facility: CLINIC | Age: 38
End: 2023-01-11
Attending: INTERNAL MEDICINE
Payer: OTHER MISCELLANEOUS

## 2023-01-11 DIAGNOSIS — M25.521 RIGHT ELBOW PAIN: Primary | ICD-10-CM

## 2023-01-11 PROCEDURE — 97140 MANUAL THERAPY 1/> REGIONS: CPT | Mod: GP | Performed by: PHYSICAL THERAPIST

## 2023-01-11 PROCEDURE — 97110 THERAPEUTIC EXERCISES: CPT | Mod: GP | Performed by: PHYSICAL THERAPIST

## 2023-01-18 ENCOUNTER — THERAPY VISIT (OUTPATIENT)
Dept: PHYSICAL THERAPY | Facility: CLINIC | Age: 38
End: 2023-01-18
Attending: INTERNAL MEDICINE
Payer: OTHER MISCELLANEOUS

## 2023-01-18 DIAGNOSIS — M25.521 RIGHT ELBOW PAIN: Primary | ICD-10-CM

## 2023-01-18 PROCEDURE — 97140 MANUAL THERAPY 1/> REGIONS: CPT | Mod: GP | Performed by: PHYSICAL THERAPIST

## 2023-01-18 PROCEDURE — 97110 THERAPEUTIC EXERCISES: CPT | Mod: GP | Performed by: PHYSICAL THERAPIST

## 2023-01-18 NOTE — PROGRESS NOTES
Subjective:  HPI  Physical Exam                    Objective:  System    Physical Exam    General     ROS    Assessment/Plan:    SUBJECTIVE  Subjective changes as noted by pt:  She can hold weights for walking lunges and sumo deadlift at the gym without increasing pain now. She was really sore to the touch after the last visit but it is now feeling better.        Current pain level: 1/10     Changes in function:  Yes (See Goal flowsheet attached for changes in current functional level)     Adverse reaction to treatment or activity:  None    OBJECTIVE  Changes in objective findings:  Yes,     Was lifting up during the concentric phase of wrist ext AG but was holding the weight, so taught pt to use her hand to lift the hand and the weight at the same time so the concentric phase was completely avoided        ASSESSMENT  Beatris continues to require intervention to meet STG and LTG's: PT  Patient's symptoms are resolving.  Patient is progressing as expected.  Response to therapy has shown an improvement in  pain level, flexibility and strength  Progress made towards STG/LTG?  Yes (See Goal flowsheet attached for updates on achievement of STG and LTG)    PLAN  Current treatment program is being advanced to more complex exercises.    PTA/ATC plan:  N/A    Please refer to the daily flowsheet for treatment today, total treatment time and time spent performing 1:1 timed codes.

## 2023-01-25 ENCOUNTER — THERAPY VISIT (OUTPATIENT)
Dept: PHYSICAL THERAPY | Facility: CLINIC | Age: 38
End: 2023-01-25
Payer: OTHER MISCELLANEOUS

## 2023-01-25 DIAGNOSIS — M25.521 RIGHT ELBOW PAIN: Primary | ICD-10-CM

## 2023-01-25 PROCEDURE — 97140 MANUAL THERAPY 1/> REGIONS: CPT | Mod: GP | Performed by: PHYSICAL THERAPIST

## 2023-01-25 NOTE — PROGRESS NOTES
Subjective:  HPI  Physical Exam                    Objective:  System    Physical Exam    General     ROS    Assessment/Plan:    SUBJECTIVE  Subjective changes as noted by pt:  Doing better--still gets a little irritated if she is lifting at the gym but work has been good for the most part--because she's doing all the repetitious cutting.        Current pain level: 0/10     Changes in function:  Yes (See Goal flowsheet attached for changes in current functional level)     Adverse reaction to treatment or activity:  None    OBJECTIVE  Changes in objective findings:  Yes, thinner but still very ropy ECRB though EDL hypertonicity is improved.    R elbow AROM is now full and pain free        ASSESSMENT  Beatris continues to require intervention to meet STG and LTG's: PT  Patient's symptoms are resolving.  Patient is progressing as expected.  Response to therapy has shown an improvement in  pain level, flexibility and strength  Progress made towards STG/LTG?  Yes (See Goal flowsheet attached for updates on achievement of STG and LTG)    PLAN  Current treatment program is being advanced to more complex exercises.    PTA/ATC plan:  N/A    Please refer to the daily flowsheet for treatment today, total treatment time and time spent performing 1:1 timed codes.

## 2023-02-01 ENCOUNTER — THERAPY VISIT (OUTPATIENT)
Dept: PHYSICAL THERAPY | Facility: CLINIC | Age: 38
End: 2023-02-01
Payer: OTHER MISCELLANEOUS

## 2023-02-01 DIAGNOSIS — M25.521 RIGHT ELBOW PAIN: Primary | ICD-10-CM

## 2023-02-01 PROCEDURE — 97110 THERAPEUTIC EXERCISES: CPT | Mod: GP | Performed by: PHYSICAL THERAPIST

## 2023-02-01 PROCEDURE — 97140 MANUAL THERAPY 1/> REGIONS: CPT | Mod: GP | Performed by: PHYSICAL THERAPIST

## 2023-02-01 PROCEDURE — 97112 NEUROMUSCULAR REEDUCATION: CPT | Mod: GP | Performed by: PHYSICAL THERAPIST

## 2023-02-01 NOTE — PROGRESS NOTES
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 She is better about wearing her elbow strap when lifting weights.       Current pain level: 0/10     Changes in function:  Yes (See Goal flowsheet attached for changes in current functional level)     Adverse reaction to treatment or activity:  None    OBJECTIVE  Changes in objective findings:  Yes,     Pt required moderate manual, verbal and visual cuing to maintain proper scapular positioning during exercises today. NMR required for serratus, MT, and LT.          ASSESSMENT  Beatris continues to require intervention to meet STG and LTG's: PT  Patient's symptoms are resolving.  Patient is progressing as expected.  Response to therapy has shown an improvement in  pain level, ROM  and flexibility  Progress made towards STG/LTG?  Yes (See Goal flowsheet attached for updates on achievement of STG and LTG)    PLAN  Current treatment program is being advanced to more complex exercises.    PTA/ATC plan:  N/A    Please refer to the daily flowsheet for treatment today, total treatment time and time spent performing 1:1 timed codes.

## 2023-02-03 ASSESSMENT — ENCOUNTER SYMPTOMS
MYALGIAS: 0
EYE PAIN: 0
DIARRHEA: 0
ARTHRALGIAS: 0
PARESTHESIAS: 0
DIZZINESS: 0
CONSTIPATION: 0
HEADACHES: 0
BREAST MASS: 0
ABDOMINAL PAIN: 0
SHORTNESS OF BREATH: 0
HEARTBURN: 0
JOINT SWELLING: 0
WEAKNESS: 0
NAUSEA: 0
NERVOUS/ANXIOUS: 0
CHILLS: 0
DYSURIA: 0
FREQUENCY: 0
FEVER: 0
SORE THROAT: 0
COUGH: 0
PALPITATIONS: 0
HEMATURIA: 0
HEMATOCHEZIA: 0

## 2023-02-10 ENCOUNTER — OFFICE VISIT (OUTPATIENT)
Dept: FAMILY MEDICINE | Facility: CLINIC | Age: 38
End: 2023-02-10
Payer: COMMERCIAL

## 2023-02-10 VITALS
HEART RATE: 64 BPM | OXYGEN SATURATION: 100 % | BODY MASS INDEX: 29.66 KG/M2 | TEMPERATURE: 97.6 F | SYSTOLIC BLOOD PRESSURE: 127 MMHG | RESPIRATION RATE: 23 BRPM | DIASTOLIC BLOOD PRESSURE: 84 MMHG | WEIGHT: 189 LBS | HEIGHT: 67 IN

## 2023-02-10 DIAGNOSIS — Z00.00 ROUTINE GENERAL MEDICAL EXAMINATION AT A HEALTH CARE FACILITY: Primary | ICD-10-CM

## 2023-02-10 DIAGNOSIS — Z13.220 SCREENING FOR HYPERLIPIDEMIA: ICD-10-CM

## 2023-02-10 DIAGNOSIS — Z13.1 SCREENING FOR DIABETES MELLITUS (DM): ICD-10-CM

## 2023-02-10 DIAGNOSIS — L70.9 ACNE, UNSPECIFIED ACNE TYPE: ICD-10-CM

## 2023-02-10 DIAGNOSIS — M77.11 LATERAL EPICONDYLITIS OF RIGHT ELBOW: ICD-10-CM

## 2023-02-10 DIAGNOSIS — Z12.4 CERVICAL CANCER SCREENING: ICD-10-CM

## 2023-02-10 LAB
CHOLEST SERPL-MCNC: 205 MG/DL
FASTING STATUS PATIENT QL REPORTED: YES
FASTING STATUS PATIENT QL REPORTED: YES
GLUCOSE BLD-MCNC: 96 MG/DL (ref 70–99)
HDLC SERPL-MCNC: 70 MG/DL
LDLC SERPL CALC-MCNC: 114 MG/DL
NONHDLC SERPL-MCNC: 135 MG/DL
TRIGL SERPL-MCNC: 106 MG/DL

## 2023-02-10 PROCEDURE — 82947 ASSAY GLUCOSE BLOOD QUANT: CPT | Performed by: FAMILY MEDICINE

## 2023-02-10 PROCEDURE — 99395 PREV VISIT EST AGE 18-39: CPT | Performed by: FAMILY MEDICINE

## 2023-02-10 PROCEDURE — 80061 LIPID PANEL: CPT | Performed by: FAMILY MEDICINE

## 2023-02-10 PROCEDURE — 36415 COLL VENOUS BLD VENIPUNCTURE: CPT | Performed by: FAMILY MEDICINE

## 2023-02-10 RX ORDER — DROSPIRENONE AND ETHINYL ESTRADIOL 0.03MG-3MG
1 KIT ORAL DAILY
Qty: 84 TABLET | Refills: 3 | Status: SHIPPED | OUTPATIENT
Start: 2023-02-10 | End: 2024-02-09

## 2023-02-10 ASSESSMENT — ENCOUNTER SYMPTOMS
NAUSEA: 0
CHILLS: 0
PARESTHESIAS: 0
SHORTNESS OF BREATH: 0
BREAST MASS: 0
COUGH: 0
PALPITATIONS: 0
EYE PAIN: 0
HEMATURIA: 0
CONSTIPATION: 0
MYALGIAS: 0
ARTHRALGIAS: 0
WEAKNESS: 0
SORE THROAT: 0
HEARTBURN: 0
ABDOMINAL PAIN: 0
FREQUENCY: 0
DIARRHEA: 0
HEADACHES: 0
DIZZINESS: 0
FEVER: 0
DYSURIA: 0
JOINT SWELLING: 0
HEMATOCHEZIA: 0
NERVOUS/ANXIOUS: 0

## 2023-02-10 ASSESSMENT — PAIN SCALES - GENERAL: PAINLEVEL: NO PAIN (0)

## 2023-02-10 NOTE — PROGRESS NOTES
SUBJECTIVE:   CC: Beatris is an 37 year old who presents for preventive health visit.     Patient has been advised of split billing requirements and indicates understanding: Yes  Healthy Habits:     Getting at least 3 servings of Calcium per day:  NO    Bi-annual eye exam:  Yes    Dental care twice a year:  Yes    Sleep apnea or symptoms of sleep apnea:  None    Diet:  Regular (no restrictions)    Frequency of exercise:  4-5 days/week    Duration of exercise:  30-45 minutes    Taking medications regularly:  Yes    Medication side effects:  Not applicable    PHQ-2 Total Score: 0    Additional concerns today:  No              Today's PHQ-2 Score:   PHQ-2 ( 1999 [employer]) 2/3/2023   Q1: Little interest or pleasure in doing things 0   Q2: Feeling down, depressed or hopeless 0   PHQ-2 Score 0   PHQ-2 Total Score (12-17 Years)- Positive if 3 or more points; Administer PHQ-A if positive -   Q1: Little interest or pleasure in doing things Not at all   Q2: Feeling down, depressed or hopeless Not at all   PHQ-2 Score 0       Have you ever done Advance Care Planning? (For example, a Health Directive, POLST, or a discussion with a medical provider or your loved ones about your wishes): No    Social History     Tobacco Use     Smoking status: Never     Passive exposure: Never     Smokeless tobacco: Never   Substance Use Topics     Alcohol use: Yes     Comment: Socially     If you drink alcohol do you typically have >3 drinks per day or >7 drinks per week? No    Alcohol Use 2/3/2023   Prescreen: >3 drinks/day or >7 drinks/week? No   Prescreen: >3 drinks/day or >7 drinks/week? -   No flowsheet data found.    Reviewed orders with patient.  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     EYE SURGERY      Lasik       Social History     Tobacco Use     Smoking status: Never     Passive exposure: Never     Smokeless tobacco: Never   Substance Use Topics     Alcohol use: Yes     Comment: Socially     Family History   Problem Relation Age of Onset     Diabetes Mother      Asthma Mother      Hyperlipidemia Father      Diabetes Maternal Grandmother      Hypertension Maternal Grandmother      Breast Cancer Maternal Grandmother      Asthma Maternal Grandmother      Heart Failure Maternal Grandmother          in her 80's     Hypertension Maternal Grandfather      Colon Cancer Maternal Grandfather      Hypertension Paternal Grandmother      Hypertension Paternal Grandfather      Coronary Artery Disease Other         Great Grandparents (Maternal and Paternal)     Breast Cancer Other         Aunt     Hyperlipidemia Other         Great Grandparents (Paternal)     Coronary Artery Disease Other         Great Grandparents (Maternal and Paternal)     Hyperlipidemia Other         Great Grandparents (Paternal)         Current Outpatient Medications   Medication Sig Dispense Refill     drospirenone-ethinyl estradiol (MAYTE) 3-0.03 MG tablet Take 1 tablet by mouth daily 84 tablet 3     Allergies   Allergen Reactions     Dairy Aid [Lactase]      Recent Labs   Lab Test 22  0834 21  0733 19  0739 18  0732 17  0737   LDL 97 107* 115* 81 140*   HDL 65 67 80 63 62   TRIG 227* 220* 169* 245* 99   CR  --   --   --  0.75  --    GFRESTIMATED  --   --   --  89  --    GFRESTBLACK  --   --   --  >90  --    POTASSIUM  --   --   --  4.1  --    TSH  --   --   --  2.57 1.68         Breast Cancer Screening:    FHS-7:   Breast CA Risk Assessment (FHS-7) 1/30/2022 2/3/2023   Did any of your first-degree relatives have breast or ovarian cancer? No No   Did any of your relatives have bilateral breast cancer? No No   Did any man in your family have breast cancer? No No   Did any woman in your family have breast and ovarian cancer? Yes Yes   Did any woman in your family have breast cancer before age 50 y? No No   Do you have 2 or more relatives with breast and/or ovarian cancer? No No   Do you have 2 or more relatives with breast and/or bowel cancer? No No     click delete button to remove this line now  Patient under 40 years of age: Routine Mammogram Screening not recommended.   Pertinent mammograms are reviewed under the imaging tab.    History of abnormal Pap smear: NO - age 30-65 PAP every 5 years with negative HPV co-testing recommended  PAP / HPV Latest Ref Rng & Units 8/5/2019 6/21/2016   PAP (Historical) - NIL NIL   HPV16 NEG:Negative Negative -   HPV18 NEG:Negative Negative -   HRHPV NEG:Negative Negative -     Reviewed and updated as needed this visit by clinical staff   Tobacco  Allergies  Meds              Reviewed and updated as needed this visit by Provider                   Past Medical History:   Diagnosis Date     Lateral epicondylitis of right elbow 2/10/2023     NO ACTIVE PROBLEMS       Past Surgical History:   Procedure Laterality Date     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       Review of Systems   Constitutional: Negative for chills and fever.   HENT: Negative for congestion, ear pain, hearing loss and sore throat.    Eyes: Negative for pain and visual disturbance.   Respiratory: Negative for cough and shortness of breath.    Cardiovascular: Negative for chest pain, palpitations and peripheral edema.   Gastrointestinal: Negative for abdominal pain, constipation, diarrhea, heartburn,  "hematochezia and nausea.   Breasts:  Negative for tenderness, breast mass and discharge.   Genitourinary: Negative for dysuria, frequency, genital sores, hematuria, pelvic pain, urgency, vaginal bleeding and vaginal discharge.   Musculoskeletal: Negative for arthralgias, joint swelling and myalgias.   Skin: Negative for rash.   Neurological: Negative for dizziness, weakness, headaches and paresthesias.   Psychiatric/Behavioral: Negative for mood changes. The patient is not nervous/anxious.      CONSTITUTIONAL: NEGATIVE for fever, chills, change in weight  INTEGUMENTARU/SKIN: NEGATIVE for worrisome rashes, moles or lesions  INTEGUMENTARY/SKIN: acne   EYES: NEGATIVE for vision changes or irritation  ENT: NEGATIVE for ear, mouth and throat problems  RESP: NEGATIVE for significant cough or SOB  BREAST: NEGATIVE for masses, tenderness or discharge  CV: NEGATIVE for chest pain, palpitations or peripheral edema  GI: NEGATIVE for nausea, abdominal pain, heartburn, or change in bowel habits  : NEGATIVE for unusual urinary or vaginal symptoms. Periods are regular.  MUSCULOSKELETAL: NEGATIVE for significant arthralgias or myalgia  NEURO: NEGATIVE for weakness, dizziness or paresthesias  ENDOCRINE: NEGATIVE for temperature intolerance, skin/hair changes  HEME/ALLERGY/IMMUNE: NEGATIVE for bleeding problems  PSYCHIATRIC: NEGATIVE for changes in mood or affect     OBJECTIVE:   /84   Pulse 64   Temp 97.6  F (36.4  C) (Oral)   Resp 23   Ht 1.702 m (5' 7\")   Wt 85.7 kg (189 lb)   LMP 01/18/2023 (Approximate)   SpO2 100%   BMI 29.60 kg/m    Physical Exam  GENERAL: healthy, alert and no distress  EYES: Eyes grossly normal to inspection, PERRL and conjunctivae and sclerae normal  HENT: ear canals and TM's normal, nose and mouth without ulcers or lesions  NECK: no adenopathy, no asymmetry, masses, or scars and thyroid normal to palpation  RESP: lungs clear to auscultation - no rales, rhonchi or wheezes  BREAST: normal " "without masses, tenderness or nipple discharge and no palpable axillary masses or adenopathy  CV: regular rate and rhythm, normal S1 S2, no S3 or S4, no murmur, click or rub, no peripheral edema and peripheral pulses strong  ABDOMEN: soft, nontender, no hepatosplenomegaly, no masses and bowel sounds normal  MS: no gross musculoskeletal defects noted, no edema  MS: Right elbow-nontender, no elbow effusion, full range of motion  SKIN: no suspicious lesions or rashes  NEURO: Normal strength and tone, mentation intact and speech normal  PSYCH: mentation appears normal, affect normal/bright    Diagnostic Test Results:  Labs reviewed in Epic    ASSESSMENT/PLAN:   (Z00.00) Routine general medical examination at a health care facility  (primary encounter diagnosis)  Comment:   Plan: Discussed on regular exercises, healthy eating, self breast exams monthly and routine dental checks.      (L70.9) Acne, unspecified acne type  Comment:   Plan: drospirenone-ethinyl estradiol (MAYTE) 3-0.03         MG tablet            (Z12.4) Cervical cancer screening  Comment:   Plan: Patient is not due for Pap until next year    (Z13.220) Screening for hyperlipidemia  Comment:   Plan: Lipid panel reflex to direct LDL Fasting            (Z13.1) Screening for diabetes mellitus (DM)  Comment:   Plan: Glucose            (M77.11) Lateral epicondylitis of right elbow  Comment:   Plan: Resolving, continue with elbow rehab exercises, continue to avoid triggering activities, apply local ice and heat as needed, recommended to start wearing elbow strap          COUNSELING:  Reviewed preventive health counseling, as reflected in patient instructions  Special attention given to:        Regular exercise       Healthy diet/nutrition       Vision screening       Contraception      BMI:   Estimated body mass index is 29.6 kg/m  as calculated from the following:    Height as of this encounter: 1.702 m (5' 7\").    Weight as of this encounter: 85.7 kg (189 lb). "   Weight management plan: Appreciated patient's efforts on weight loss, continue with regular exercises, healthy eating      She reports that she has never smoked. She has never been exposed to tobacco smoke. She has never used smokeless tobacco.      Chart documentation done in part with Dragon Voice recognition Software. Although reviewed after completion, some word and grammatical error may remain.  Karon Flor MD  [facility]

## 2023-02-10 NOTE — RESULT ENCOUNTER NOTE
Mikey Spear,  Your labs from today showed normal fasting blood sugar with no concern for diabetes, excellent and improved fasting triglycerides and good cholesterol-HDL.  Your LDL-bad cholesterol is in range for your age, please continue with your efforts on healthy eating, regular exercises and weight loss   Let me know if you have any questions. Take care.  Karon Flor MD

## 2023-02-15 ENCOUNTER — THERAPY VISIT (OUTPATIENT)
Dept: PHYSICAL THERAPY | Facility: CLINIC | Age: 38
End: 2023-02-15
Payer: OTHER MISCELLANEOUS

## 2023-02-15 DIAGNOSIS — M77.11 LATERAL EPICONDYLITIS OF RIGHT ELBOW: Primary | ICD-10-CM

## 2023-02-15 DIAGNOSIS — M25.521 RIGHT ELBOW PAIN: ICD-10-CM

## 2023-02-15 PROCEDURE — 97110 THERAPEUTIC EXERCISES: CPT | Mod: GP | Performed by: PHYSICAL THERAPIST

## 2023-02-15 PROCEDURE — 97140 MANUAL THERAPY 1/> REGIONS: CPT | Mod: GP | Performed by: PHYSICAL THERAPIST

## 2023-02-15 PROCEDURE — 97112 NEUROMUSCULAR REEDUCATION: CPT | Mod: GP | Performed by: PHYSICAL THERAPIST

## 2023-02-15 NOTE — PROGRESS NOTES
"Subjective:  HPI  Physical Exam                    Objective:  System    Physical Exam    General     ROS    Assessment/Plan:    PROGRESS  REPORT    Progress reporting period is from 1-3-23 to 2-15-23.       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          Current pain level is 0/10  .     Previous pain level was  2/10  .   Changes in function:  Yes (See Goal flowsheet attached for changes in current functional level)  Adverse reaction to treatment or activity: None    OBJECTIVE  Changes noted in objective findings:  Yes,     L elbow AROM: 15-0-142  R elbow AROM: 15-0-143    R shoulder strength: 4+/4-/4+/4- and L shoulder: 4+/4/4+/4        ASSESSMENT/PLAN  Updated problem list and treatment plan: Diagnosis 1:  R lateral epidondylitis    Decreased strength - therapeutic exercise and therapeutic activities  STG/LTGs have been met or progress has been made towards goals:  Yes (See Goal flow sheet completed today.)  Assessment of Progress: The patient's condition is improving.  The patient's condition has potential to improve.  The patient has met all of their long term goals.  Self Management Plans:  Patient has been instructed in a home treatment program.  Patient is independent in a home treatment program.  Patient  has been instructed in self management of symptoms.  Patient is independent in self management of symptoms.  I have re-evaluated this patient and find that the nature, scope, duration and intensity of the therapy is appropriate for the medical condition of the patient.  Beatris continues to require the following " intervention to meet STG and LTG's:  PT intervention is no longer required to meet STG/LTG.    Recommendations:  This patient is ready to be discharged from therapy and continue their home treatment program.    Please refer to the daily flowsheet for treatment today, total treatment time and time spent performing 1:1 timed codes.

## 2023-02-28 ENCOUNTER — TELEPHONE (OUTPATIENT)
Dept: FAMILY MEDICINE | Facility: CLINIC | Age: 38
End: 2023-02-28
Payer: COMMERCIAL

## 2023-02-28 NOTE — TELEPHONE ENCOUNTER
Pt was on Dr. Barney's sched on 3/2 and provider will be out this day. This is work comp and pt is waiting to be able to return to work. You had a same day slot on 3/7. Please let me know if this is ok or if you would prefer I reschedule pt for a diff day/time or with another provider.  Vivienne Badillo, CMA

## 2023-03-07 ENCOUNTER — OFFICE VISIT (OUTPATIENT)
Dept: FAMILY MEDICINE | Facility: CLINIC | Age: 38
End: 2023-03-07
Payer: OTHER MISCELLANEOUS

## 2023-03-07 VITALS
BODY MASS INDEX: 29.98 KG/M2 | TEMPERATURE: 98.1 F | HEART RATE: 63 BPM | RESPIRATION RATE: 12 BRPM | WEIGHT: 191 LBS | DIASTOLIC BLOOD PRESSURE: 78 MMHG | SYSTOLIC BLOOD PRESSURE: 127 MMHG | OXYGEN SATURATION: 99 % | HEIGHT: 67 IN

## 2023-03-07 DIAGNOSIS — M77.11 LATERAL EPICONDYLITIS OF RIGHT ELBOW: Primary | ICD-10-CM

## 2023-03-07 DIAGNOSIS — M77.8 TENDONITIS OF WRIST, RIGHT: ICD-10-CM

## 2023-03-07 PROCEDURE — 99213 OFFICE O/P EST LOW 20 MIN: CPT | Performed by: FAMILY MEDICINE

## 2023-03-07 ASSESSMENT — PAIN SCALES - GENERAL: PAINLEVEL: NO PAIN (0)

## 2023-03-07 NOTE — LETTER
March 7, 2023      Beatris Santo  [address]  [facility] [address]      To whom it may concern:    Beatris Santo was seen in our clinic.   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            Sincerely,      Karon Flor MD

## 2023-03-07 NOTE — PROGRESS NOTES
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      Tendonitis of wrist, right  as above                 Chart documentation done in part with Dragon Voice recognition Software. Although reviewed after completion, some word and grammatical error may remain.    See Patient Instructions    Return in about 3 months (around 6/7/2023), or if symptoms worsen or fail to improve.    Karon Flor MD  [facility]    Crystal Spear is a 37 year old, presenting for the following health issues:  Tendonitis    Patient is here for the follow-up on her right elbow pain from epicondylitis and right wrist pain from tendinitis  She was initially seen by Dr. 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  Patient went through physical therapy with near resolution of symptoms at this time  Patient is still concerned and is reluctant to go back to her previous job but is wishing to give a trial for the wire cutting to see how she does for a couple of hours per day  Denies swelling of the wrist, recent history of fall or trauma to the wrist and elbow  Patient is right-handed  Denies left-sided symptoms, right hand weakness, tingling, numbness of the right upper extremity    History of Present Illness  "      Reason for visit:  Tendonitis    She eats 2-3 servings of fruits and vegetables daily.She consumes 1 sweetened beverage(s) daily.She exercises with enough effort to increase her heart rate 30 to 60 minutes per day.  She exercises with enough effort to increase her heart rate 4 days per week.   She is taking medications regularly.             Review of Systems   CONSTITUTIONAL: NEGATIVE for fever, chills, change in weight  MUSCULOSKELETAL: as above  NEURO: NEGATIVE for weakness, dizziness or paresthesias  PSYCHIATRIC: NEGATIVE for changes in mood or affect      Objective    /78   Pulse 63   Temp 98.1  F (36.7  C) (Oral)   Resp 12   Ht 1.689 m (5' 6.5\")   Wt 86.6 kg (191 lb)   LMP 01/18/2023 (Approximate)   SpO2 99%   BMI 30.37 kg/m    Body mass index is 30.37 kg/m .  Physical Exam   GENERAL: healthy, alert and no distress  MS: Normal exam on inspection on both the right wrist and elbow full range of motion,, nontender to touch over the epicondyles and olecranon process no elbow effusion,, no redness, warmth noted  SKIN: no suspicious lesions or rashes  NEURO: Normal strength and tone, mentation intact and speech normal  PSYCH: mentation appears normal, affect normal/bright                    "

## 2023-03-08 ENCOUNTER — TELEPHONE (OUTPATIENT)
Dept: FAMILY MEDICINE | Facility: CLINIC | Age: 38
End: 2023-03-08
Payer: COMMERCIAL

## 2023-03-08 NOTE — TELEPHONE ENCOUNTER
Forms/Letter Request    Type of form/letter: Lila Villatoro    Have you been seen for this request: N/A    Do we have the form/letter: Yes: placed in providers forms basket for review    When is form/letter needed by: ASAP    How would you like the form/letter returned: Fax  [phone]

## 2023-03-08 NOTE — TELEPHONE ENCOUNTER
Reviewing the form there is no place for provider to sign it appears that pt they are requesting OV notes/records for this pt. I have faxed this over to our HIMS Dept @  [phone].  Vivienne Badillo CMA

## 2024-01-01 NOTE — PATIENT INSTRUCTIONS
Finish this months pack of birth control pills then start with the new pack and skip placebo pills for that month.    Try using Aquaphor on the edge of your nose      Preventive Health Recommendations  Female Ages 26 - 39  Yearly exam:   See your health care provider every year in order to    Review health changes.     Discuss preventive care.      Review your medicines if you your doctor has prescribed any.    Until age 30: Get a Pap test every three years (more often if you have had an abnormal result).    After age 30: Talk to your doctor about whether you should have a Pap test every 3 years or have a Pap test with HPV screening every 5 years.   You do not need a Pap test if your uterus was removed (hysterectomy) and you have not had cancer.  You should be tested each year for STDs (sexually transmitted diseases), if you're at risk.   Talk to your provider about how often to have your cholesterol checked.  If you are at risk for diabetes, you should have a diabetes test (fasting glucose).  Shots: Get a flu shot each year. Get a tetanus shot every 10 years.   Nutrition:     Eat at least 5 servings of fruits and vegetables each day.    Eat whole-grain bread, whole-wheat pasta and brown rice instead of white grains and rice.    Talk to your provider about Calcium and Vitamin D.     Lifestyle    Exercise at least 150 minutes a week (30 minutes a day, 5 days of the week). This will help you control your weight and prevent disease.    Limit alcohol to one drink per day.    No smoking.     Wear sunscreen to prevent skin cancer.    See your dentist every six months for an exam and cleaning.    
General

## 2024-02-09 DIAGNOSIS — L70.9 ACNE, UNSPECIFIED ACNE TYPE: ICD-10-CM

## 2024-02-09 RX ORDER — DROSPIRENONE AND ETHINYL ESTRADIOL 0.03MG-3MG
1 KIT ORAL DAILY
Qty: 84 TABLET | Refills: 0 | Status: SHIPPED | OUTPATIENT
Start: 2024-02-09 | End: 2024-02-20

## 2024-02-13 SDOH — HEALTH STABILITY: PHYSICAL HEALTH: ON AVERAGE, HOW MANY DAYS PER WEEK DO YOU ENGAGE IN MODERATE TO STRENUOUS EXERCISE (LIKE A BRISK WALK)?: 4 DAYS

## 2024-02-13 SDOH — HEALTH STABILITY: PHYSICAL HEALTH: ON AVERAGE, HOW MANY MINUTES DO YOU ENGAGE IN EXERCISE AT THIS LEVEL?: 60 MIN

## 2024-02-13 ASSESSMENT — SOCIAL DETERMINANTS OF HEALTH (SDOH): HOW OFTEN DO YOU GET TOGETHER WITH FRIENDS OR RELATIVES?: ONCE A WEEK

## 2024-02-13 NOTE — COMMUNITY RESOURCES LIST (ENGLISH)
02/13/2024   [facility]  N/A  For questions about this resource list or additional care needs, please contact your primary care clinic or care manager.  Phone: [phone]   Email: N/A   Address: [facility] [place] [address]   Hours: N/A        Hotlines and Helplines       Hotline - Housing crisis  1  [facility] Distance: 13.14 miles      Phone/Virtual   [address] [place] [address]  Language: English  Hours: Mon - Sun Open 24 Hours   Phone: (603) 929-1392 Email: info@[employer].Phone.com Website: http://www.[employer].Phone.com     2  [facility] Distance: 13.48 miles      Phone/Virtual   [address] [place] [address]  Language: English  Hours: Mon - Sun Open 24 Hours  Fees: Free   Phone: (743) 718-7929 Email: info@[employer].org Website: http://www.[employer].org/          Housing       Coordinated Entry access point  3  [facility] ([facility] Distance: 12.67 miles      In-Person, Phone/Virtual   [address] [place] [address]  Language: English, Yi  Hours: Mon - Fri 10:00 AM - 5:30 PM , Mon - Sun 7:30 PM - 10:20 PM , Sat - Sun 1:00 PM - 5:30 PM  Fees: Free   Phone: (527) 504-3564 Email: info@[employer].Phone.com Website: https://www.[employer].org/our-programs/adult-shelter-connect-[facility]/     4  [facility] - Adult half-way Connect [facility] Distance: 13.48 miles      In-Person   [address] [place] [address]  Language: English  Hours: Mon - Sat 10:00 PM - 5:00 PM  Fees: Free   Phone: (690) 543-3967 Email: info@[employer].Phone.com Website: http://www.[employer].org/     Drop-in center or day shelter  5  [facility] Distance: 12.64 miles      In-Person   [address] [place] [address]  Language: English, Hmong, [place], Yi  Hours: Mon - Thu 8:30 AM - 4:30 PM , Sat - Sun 9:00 AM - 12:00 PM  Fees: Free   Phone: (751) 326-9151 Email:  info@[employer].org Website: https://[employer].org/     6  [facility] Distance: 13.94 miles      In-Person   [address] [place] [address]  Language: English  Hours: Mon - Fri 12:00 PM - 3:00 PM  Fees: Free   Phone: (701) 690-4199 Email: [email or web address].com Website: http://[employer]/     Housing search assistance  7  [facility] ([facility] Distance: 5.61 miles      In-Person   [address] [place] [address]  Language: English  Hours: Mon - Fri 8:00 AM - 4:00 PM  Fees: Free   Phone: (187) 260-9952 Email: info@[facility].[employer] Website: https://[employer]/     8  Neighborhood Assistance [employer] [facility] Distance: 9.74 miles      Phone/Virtual   [address] [place] [address]  Language: English, [place] citizen  Hours: Mon - Fri 9:00 AM - 5:00 PM  Fees: Free   Phone: (313) 919-3252 Email: services@[employer] Website: https://www.[employer]     Shelter for individuals  9  [facility] Distance: 12.94 miles      In-Person   [address] [place] [address]  Language: English  Hours: Mon - Fri 4:00 PM - 9:00 AM  Fees: Free   Phone: (707) 650-8521 Email: asim@[facility].org Website: https://centralDr. [facility].org/northern/[facility]/     10  [facility] Distance: 14.34 miles      In-Person   [address] [place] [address]  Language: English  Hours: Mon - Sun Open 24 Hours  Fees: Free   Phone: (565) 735-6478 Email: communications@[employer].org Website: https://[place]-[place].org/oursaviourshousing/          Important Numbers & Websites       Emergency Services   911  [facility] Services   311  Poison Control   (244) 144-4021  Suicide Prevention Lifeline   (980) 926-4992 (TALK)  Child Abuse Hotline   (302) 128-4279 (4-A-Child)  Sexual Assault Hotline   (528) 834-3195 (HOPE)  [facility]    (238) 406-5100 (RUNAWAY)  [employer] Talkline   (307) 306-8961  Substance Abuse Referral   (968) 606-2040 (HELP)

## 2024-02-20 ENCOUNTER — OFFICE VISIT (OUTPATIENT)
Dept: FAMILY MEDICINE | Facility: CLINIC | Age: 39
End: 2024-02-20
Payer: COMMERCIAL

## 2024-02-20 VITALS
RESPIRATION RATE: 12 BRPM | HEIGHT: 67 IN | OXYGEN SATURATION: 100 % | WEIGHT: 198.9 LBS | TEMPERATURE: 98 F | BODY MASS INDEX: 31.22 KG/M2 | HEART RATE: 66 BPM | SYSTOLIC BLOOD PRESSURE: 137 MMHG | DIASTOLIC BLOOD PRESSURE: 87 MMHG

## 2024-02-20 DIAGNOSIS — L70.9 ACNE, UNSPECIFIED ACNE TYPE: ICD-10-CM

## 2024-02-20 DIAGNOSIS — Z13.1 SCREENING FOR DIABETES MELLITUS (DM): ICD-10-CM

## 2024-02-20 DIAGNOSIS — Z80.0 FAMILY HISTORY OF COLON CANCER: ICD-10-CM

## 2024-02-20 DIAGNOSIS — Z13.220 SCREENING FOR HYPERLIPIDEMIA: ICD-10-CM

## 2024-02-20 DIAGNOSIS — Z12.4 CERVICAL CANCER SCREENING: ICD-10-CM

## 2024-02-20 DIAGNOSIS — Z83.719 FAMILY HISTORY OF COLONIC POLYPS: ICD-10-CM

## 2024-02-20 DIAGNOSIS — R73.01 ELEVATED FASTING GLUCOSE: ICD-10-CM

## 2024-02-20 DIAGNOSIS — Z00.00 ROUTINE GENERAL MEDICAL EXAMINATION AT A HEALTH CARE FACILITY: Primary | ICD-10-CM

## 2024-02-20 DIAGNOSIS — Z13.0 SCREENING FOR DEFICIENCY ANEMIA: ICD-10-CM

## 2024-02-20 LAB
CHOLEST SERPL-MCNC: 227 MG/DL
ERYTHROCYTE [DISTWIDTH] IN BLOOD BY AUTOMATED COUNT: 11.7 % (ref 10–15)
FASTING STATUS PATIENT QL REPORTED: YES
FASTING STATUS PATIENT QL REPORTED: YES
GLUCOSE SERPL-MCNC: 100 MG/DL (ref 70–99)
HBA1C MFR BLD: 5.6 % (ref 0–5.6)
HCT VFR BLD AUTO: 38.2 % (ref 35–47)
HDLC SERPL-MCNC: 60 MG/DL
HGB BLD-MCNC: 12.7 G/DL (ref 11.7–15.7)
LDLC SERPL CALC-MCNC: 128 MG/DL
MCH RBC QN AUTO: 29.8 PG (ref 26.5–33)
MCHC RBC AUTO-ENTMCNC: 33.2 G/DL (ref 31.5–36.5)
MCV RBC AUTO: 90 FL (ref 78–100)
NONHDLC SERPL-MCNC: 167 MG/DL
PLATELET # BLD AUTO: 287 10E3/UL (ref 150–450)
RBC # BLD AUTO: 4.26 10E6/UL (ref 3.8–5.2)
TRIGL SERPL-MCNC: 194 MG/DL
WBC # BLD AUTO: 8.1 10E3/UL (ref 4–11)

## 2024-02-20 PROCEDURE — 87624 HPV HI-RISK TYP POOLED RSLT: CPT | Performed by: FAMILY MEDICINE

## 2024-02-20 PROCEDURE — 99395 PREV VISIT EST AGE 18-39: CPT | Performed by: FAMILY MEDICINE

## 2024-02-20 PROCEDURE — 85027 COMPLETE CBC AUTOMATED: CPT | Performed by: FAMILY MEDICINE

## 2024-02-20 PROCEDURE — 82947 ASSAY GLUCOSE BLOOD QUANT: CPT | Performed by: FAMILY MEDICINE

## 2024-02-20 PROCEDURE — G0123 SCREEN CERV/VAG THIN LAYER: HCPCS | Performed by: FAMILY MEDICINE

## 2024-02-20 PROCEDURE — 36415 COLL VENOUS BLD VENIPUNCTURE: CPT | Performed by: FAMILY MEDICINE

## 2024-02-20 PROCEDURE — G0124 SCREEN C/V THIN LAYER BY MD: HCPCS | Performed by: PATHOLOGY

## 2024-02-20 PROCEDURE — 80061 LIPID PANEL: CPT | Performed by: FAMILY MEDICINE

## 2024-02-20 PROCEDURE — 83036 HEMOGLOBIN GLYCOSYLATED A1C: CPT | Performed by: FAMILY MEDICINE

## 2024-02-20 RX ORDER — DROSPIRENONE AND ETHINYL ESTRADIOL 0.03MG-3MG
1 KIT ORAL DAILY
Qty: 84 TABLET | Refills: 3 | Status: SHIPPED | OUTPATIENT
Start: 2024-02-20

## 2024-02-20 ASSESSMENT — PAIN SCALES - GENERAL: PAINLEVEL: NO PAIN (0)

## 2024-02-20 NOTE — PROGRESS NOTES
"Preventive Care Visit  [facility]  Karon Flor MD, Family Medicine  Feb 20, 2024    Assessment & Plan     Routine general medical examination at a health care facility  Discussed on regular exercises, healthy eating, self breast exams monthly and routine dental checks.    Cervical cancer screening    - Pap Screen with HPV - recommended age 30 - 65 years    Screening for deficiency anemia    - CBC with platelets; Future    Screening for diabetes mellitus (DM)    - Glucose; Future    Screening for hyperlipidemia    - Lipid panel reflex to direct LDL Fasting; Future    Acne, unspecified acne type  Stable, refills given per patient's request  - drospirenone-ethinyl estradiol (MAYTE) 3-0.03 MG tablet; Take 1 tablet by mouth daily    Family history of colonic polyps  Comment:   Plan: Colonoscopy Screening  Referral        Patient has family history of colon cancer in maternal grandfather and colon polyps in mother  She will check with insurance for coverage before proceeding with a colonoscopy for screening    Family history of colon cancer  Comment:   Plan: Colonoscopy Screening  Referral        as above        Review of the result(s) of each unique test - CBC, glucose, lipids        BMI  Estimated body mass index is 30.88 kg/m  as calculated from the following:    Height as of this encounter: 1.709 m (5' 7.3\").    Weight as of this encounter: 90.2 kg (198 lb 14.4 oz).   Weight management plan: Discussed healthy diet and exercise guidelines    Counseling  Appropriate preventive services were discussed with this patient, including applicable screening as appropriate for fall prevention, nutrition, physical activity, Tobacco-use cessation, weight loss and cognition.  Checklist reviewing preventive services available has been given to the patient.  Reviewed patient's diet, addressing concerns and/or questions.   She is at risk for psychosocial distress and has been provided " with information to reduce risk.         Chart documentation done in part with Dragon Voice recognition Software. Although reviewed after completion, some word and grammatical error may remain.    See Patient Instructions    Crystal Spear is a 38 year old, presenting for the following:  Physical        2/20/2024     7:11 AM   Additional Questions   Roomed by Vicki   Accompanied by self        Health Care Directive  Patient does not have a Health Care Directive or Living Will: Discussed advance care planning with patient; however, patient declined at this time.    HPI              2/13/2024   General Health   How would you rate your overall physical health? Good   Feel stress (tense, anxious, or unable to sleep) Only a little   (!) STRESS CONCERN      2/13/2024   Nutrition   Three or more servings of calcium each day? (!) I DON'T KNOW   Diet: Regular (no restrictions)   How many servings of fruit and vegetables per day? (!) 2-3   How many sweetened beverages each day? 0-1         2/13/2024   Exercise   Days per week of moderate/strenous exercise 4 days   Average minutes spent exercising at this level 60 min         2/13/2024   Social Factors   Frequency of gathering with friends or relatives Once a week   Worry food won't last until get money to buy more No   Food not last or not have enough money for food? No   Do you have housing?  No   Are you worried about losing your housing? No   Lack of transportation? No   Unable to get utilities (heat,electricity)? No   Want help with housing or utility concern? No   (!) HOUSING CONCERN PRESENT      2/13/2024   Dental   Dentist two times every year? Yes         2/13/2024   TB Screening   Were you born outside of [place]?  No           Today's PHQ-2 Score:       2/20/2024     7:07 AM   PHQ-2 ( 1999 [employer])   Q1: Little interest or pleasure in doing things 0   Q2: Feeling down, depressed or hopeless 0   PHQ-2 Score 0   Q1: Little interest or pleasure in doing things Not at  all   Q2: Feeling down, depressed or hopeless Not at all   PHQ-2 Score 0         2/13/2024   Substance Use   Alcohol more than 3/day or more than 7/wk No   Do you use any other substances recreationally? No     Social History     Tobacco Use    Smoking status: Never     Passive exposure: Never    Smokeless tobacco: Never   Vaping Use    Vaping Use: Never used   Substance Use Topics    Alcohol use: Yes     Comment: Socially    Drug use: No           2/3/2023   LAST FHS-7 RESULTS   1st degree relative breast or ovarian cancer No   Any relative bilateral breast cancer No   Any male have breast cancer No   Any woman have breast and ovarian cancer Yes   Any woman with breast cancer before 50yrs No   2 or more relatives with breast and/or ovarian cancer No   2 or more relatives with breast and/or bowel cancer No        Mammogram Screening - Patient under 40 years of age: Routine Mammogram Screening not recommended.         2/13/2024   STI Screening   New sexual partner(s) since last STI/HIV test? No     History of abnormal Pap smear: NO - age 30-65 PAP every 5 years with negative HPV co-testing recommended        Latest Ref Rng & Units 8/5/2019     7:29 AM 8/5/2019     7:13 AM 6/21/2016    12:00 AM   PAP / HPV   PAP (Historical)   NIL  NIL    HPV 16 DNA NEG^Negative Negative      HPV 18 DNA NEG^Negative Negative      Other HR HPV NEG^Negative Negative              2/13/2024   Contraception/Family Planning   Questions about contraception or family planning No        Reviewed and updated as needed this visit by Provider                    Past Medical History:   Diagnosis Date    Lateral epicondylitis of right elbow 2/10/2023    NO ACTIVE PROBLEMS      Past Surgical History:   Procedure Laterality Date    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     Lab work is in process  Labs reviewed in EPIC  BP Readings from Last 3 Encounters:   02/20/24 137/87    23 127/78   02/10/23 127/84    Wt Readings from Last 3 Encounters:   24 90.2 kg (198 lb 14.4 oz)   23 86.6 kg (191 lb)   02/10/23 85.7 kg (189 lb)                  Patient Active Problem List   Diagnosis    Acne, unspecified acne type    Lactose intolerance    Lateral epicondylitis of right elbow    Tendonitis of wrist, right     Past Surgical History:   Procedure Laterality Date    BONE MARROW      donated bone marrow    EYE SURGERY      Lasik       Social History     Tobacco Use    Smoking status: Never     Passive exposure: Never    Smokeless tobacco: Never   Substance Use Topics    Alcohol use: Yes     Comment: Socially     Family History   Problem Relation Age of Onset    Diabetes Mother     Asthma Mother     Hyperlipidemia Father     Diabetes Maternal Grandmother     Hypertension Maternal Grandmother     Breast Cancer Maternal Grandmother     Asthma Maternal Grandmother     Heart Failure Maternal Grandmother          in her 80's    Hypertension Maternal Grandfather     Colon Cancer Maternal Grandfather 86    Hypertension Paternal Grandmother     Hypertension Paternal Grandfather     Coronary Artery Disease Other         Great Grandparents (Maternal and Paternal)    Breast Cancer Other         Paternal Half-Aunt    Hyperlipidemia Other         Great Grandparents (Paternal)    Coronary Artery Disease Other         Great Grandparents (Maternal and Paternal)    Hyperlipidemia Other         Great Grandparents (Paternal)         Current Outpatient Medications   Medication Sig Dispense Refill    drospirenone-ethinyl estradiol (MAYTE) 3-0.03 MG tablet Take 1 tablet by mouth daily 84 tablet 3     Allergies   Allergen Reactions    Dairy Aid [Tilactase]      Recent Labs   Lab Test 02/10/23  0724 22  0834 21  0733 19  0739 18  0732 17  0737   * 97 107*   < > 81 140*   HDL 70 65 67   < > 63 62   TRIG 106 227* 220*   < > 245* 99   CR  --   --   --   --   "0.75  --    GFRESTIMATED  --   --   --   --  89  --    GFRESTBLACK  --   --   --   --  >90  --    POTASSIUM  --   --   --   --  4.1  --    TSH  --   --   --   --  2.57 1.68    < > = values in this interval not displayed.          Review of Systems  CONSTITUTIONAL: NEGATIVE for fever, chills, change in weight  INTEGUMENTARY/SKIN: NEGATIVE for worrisome rashes, moles or lesions  INTEGUMENTARY/SKIN: History of acne  EYES: NEGATIVE for vision changes or irritation  ENT/MOUTH: NEGATIVE for ear, mouth and throat problems  RESP: NEGATIVE for significant cough or SOB  BREAST: NEGATIVE for masses, tenderness or discharge  CV: NEGATIVE for chest pain, palpitations or peripheral edema  GI: NEGATIVE for nausea, abdominal pain, heartburn, or change in bowel habits  : NEGATIVE for frequency, dysuria, or hematuria  MUSCULOSKELETAL: NEGATIVE for significant arthralgias or myalgia  NEURO: NEGATIVE for weakness, dizziness or paresthesias  ENDOCRINE: NEGATIVE for temperature intolerance, skin/hair changes  HEME: NEGATIVE for bleeding problems  PSYCHIATRIC: NEGATIVE for changes in mood or affect     Objective    Exam  /87 (BP Location: Right arm, Patient Position: Sitting, Cuff Size: Adult Regular)   Pulse 66   Temp 98  F (36.7  C) (Temporal)   Resp 12   Ht 1.709 m (5' 7.3\")   Wt 90.2 kg (198 lb 14.4 oz)   SpO2 100%   BMI 30.88 kg/m     Estimated body mass index is 30.88 kg/m  as calculated from the following:    Height as of this encounter: 1.709 m (5' 7.3\").    Weight as of this encounter: 90.2 kg (198 lb 14.4 oz).    Physical Exam  GENERAL: alert and no distress  EYES: Eyes grossly normal to inspection, PERRL and conjunctivae and sclerae normal  HENT: ear canals and TM's normal, nose and mouth without ulcers or lesions  NECK: no adenopathy, no asymmetry, masses, or scars  RESP: lungs clear to auscultation - no rales, rhonchi or wheezes  CV: regular rate and rhythm, normal S1 S2, no S3 or S4, no murmur, click or rub, " no peripheral edema  ABDOMEN: soft, nontender, no hepatosplenomegaly, no masses and bowel sounds normal   (female) w/bimanual: normal female external genitalia, normal urethral meatus, normal vaginal mucosa, and normal cervix/adnexa/uterus without masses or discharge  MS: no gross musculoskeletal defects noted, no edema  SKIN: no suspicious lesions or rashes  NEURO: Normal strength and tone, mentation intact and speech normal  PSYCH: mentation appears normal, affect normal/bright      Signed Electronically by: Karon Flor MD

## 2024-02-20 NOTE — PATIENT INSTRUCTIONS
Preventive Care Advice   This is general advice given by our system to help you stay healthy. However, your care team may have specific advice just for you. Please talk to your care team about your preventive care needs.  Nutrition  Eat 5 or more servings of fruits and vegetables each day.  Try wheat bread, brown rice and whole grain pasta (instead of white bread, rice, and pasta).  Get enough calcium and vitamin D. Check the label on foods and aim for 100% of the RDA (recommended daily allowance).  Lifestyle  Exercise at least 150 minutes each week  (30 minutes a day, 5 days a week).  Do muscle strengthening activities 2 days a week. These help control your weight and prevent disease.  No smoking.  Wear sunscreen to prevent skin cancer.  Have a dental exam and cleaning every 6 months.  Yearly exams  See your health care team every year to talk about:  Any changes in your health.  Any medicines your care team has prescribed.  Preventive care, family planning, and ways to prevent chronic diseases.  Shots (vaccines)   HPV shots (up to age 26), if you've never had them before.  Hepatitis B shots (up to age 59), if you've never had them before.  COVID-19 shot: Get this shot when it's due.  Flu shot: Get a flu shot every year.  Tetanus shot: Get a tetanus shot every 10 years.  Pneumococcal, hepatitis A, and RSV shots: Ask your care team if you need these based on your risk.  Shingles shot (for age 50 and up)  General health tests  Diabetes screening:  Starting at age 35, Get screened for diabetes at least every 3 years.  If you are younger than age 35, ask your care team if you should be screened for diabetes.  Cholesterol test: At age 39, start having a cholesterol test every 5 years, or more often if advised.  Bone density scan (DEXA): At age 50, ask your care team if you should have this scan for osteoporosis (brittle bones).  Hepatitis C: Get tested at least once in your life.  STIs (sexually transmitted  infections)  Before age 24: Ask your care team if you should be screened for STIs.  After age 24: Get screened for STIs if you're at risk. You are at risk for STIs (including HIV) if:  You are sexually active with more than one person.  You don't use condoms every time.  You or a partner was diagnosed with a sexually transmitted infection.  If you are at risk for HIV, ask about PrEP medicine to prevent HIV.  Get tested for HIV at least once in your life, whether you are at risk for HIV or not.  Cancer screening tests  Cervical cancer screening: If you have a cervix, begin getting regular cervical cancer screening tests starting at age 21.  Breast cancer scan (mammogram): If you've ever had breasts, begin having regular mammograms starting at age 40. This is a scan to check for breast cancer.  Colon cancer screening: It is important to start screening for colon cancer at age 45.  Have a colonoscopy test every 10 years (or more often if you're at risk) Or, ask your provider about stool tests like a FIT test every year or Cologuard test every 3 years.  To learn more about your testing options, visit:   https://www.[employer]/763167.pdf.  For help making a decision, visit:   https://bit.ly/wm06224.  Prostate cancer screening test: If you have a prostate, ask your care team if a prostate cancer screening test (PSA) at age 55 is right for you.  Lung cancer screening: If you are a current or former smoker ages 50 to 80, ask your care team if ongoing lung cancer screenings are right for you.  For informational purposes only. Not to replace the advice of your health care provider. Copyright   2023 [facility]. All rights reserved. Clinically reviewed by the [facility]. [employer] 468394 - REV 01/24.    Learning About Stress  What is stress?     Stress is your body's response to a hard situation. Your body can have a physical, emotional, or mental response. Stress is a fact of life for  most people, and it affects everyone differently. What causes stress for you may not be stressful for someone else.  A lot of things can cause stress. You may feel stress when you go on a job interview, take a test, or run a race. This kind of short-term stress is normal and even useful. It can help you if you need to work hard or react quickly. For example, stress can help you finish an important job on time.  Long-term stress is caused by ongoing stressful situations or events. Examples of long-term stress include long-term health problems, ongoing problems at work, or conflicts in your family. Long-term stress can harm your health.  How does stress affect your health?  When you are stressed, your body responds as though you are in danger. It makes hormones that speed up your heart, make you breathe faster, and give you a burst of energy. This is called the fight-or-flight stress response. If the stress is over quickly, your body goes back to normal and no harm is done.  But if stress happens too often or lasts too long, it can have bad effects. Long-term stress can make you more likely to get sick, and it can make symptoms of some diseases worse. If you tense up when you are stressed, you may develop neck, shoulder, or low back pain. Stress is linked to high blood pressure and heart disease.  Stress also harms your emotional health. It can make you solares, tense, or depressed. Your relationships may suffer, and you may not do well at work or school.  What can you do to manage stress?  You can try these things to help manage stress:   Do something active. Exercise or activity can help reduce stress. Walking is a great way to get started. Even everyday activities such as housecleaning or yard work can help.  Try yoga or xiomara chi. These techniques combine exercise and meditation. You may need some training at first to learn them.  Do something you enjoy. For example, listen to music or go to a movie. Practice your  "hobby or do volunteer work.  Meditate. This can help you relax, because you are not worrying about what happened before or what may happen in the future.  Do guided imagery. Imagine yourself in any setting that helps you feel calm. You can use online videos, books, or a teacher to guide you.  Do breathing exercises. For example:  From a standing position, bend forward from the waist with your knees slightly bent. Let your arms dangle close to the floor.  Breathe in slowly and deeply as you return to a standing position. Roll up slowly and lift your head last.  Hold your breath for just a few seconds in the standing position.  Breathe out slowly and bend forward from the waist.  Let your feelings out. Talk, laugh, cry, and express anger when you need to. Talking with supportive friends or family, a counselor, or a isela leader about your feelings is a healthy way to relieve stress. Avoid discussing your feelings with people who make you feel worse.  Write. It may help to write about things that are bothering you. This helps you find out how much stress you feel and what is causing it. When you know this, you can find better ways to cope.  What can you do to prevent stress?  You might try some of these things to help prevent stress:  Manage your time. This helps you find time to do the things you want and need to do.  Get enough sleep. Your body recovers from the stresses of the day while you are sleeping.  Get support. Your family, friends, and community can make a difference in how you experience stress.  Limit your news feed. Avoid or limit time on social media or news that may make you feel stressed.  Do something active. Exercise or activity can help reduce stress. Walking is a great way to get started.  Where can you learn more?  Go to https://www.healthwise.net/patiented  Enter N032 in the search box to learn more about \"Learning About Stress.\"  Current as of: February 26, 2023               Content Version: " 13.8    9722-3027 [email or web address].   Care instructions adapted under license by your healthcare professional. If you have questions about a medical condition or this instruction, always ask your healthcare professional. [email or web address] disclaims any warranty or liability for your use of this information.

## 2024-02-20 NOTE — RESULT ENCOUNTER NOTE
Dear Beatris,  Your recent labs showed normal hemoglobin with no concern for anemia, slight elevated fasting blood sugar but with no concern for diabetes or prediabetes given the normal A1c which is a lab for 3-month average of blood sugars.  Your fasting cholesterol numbers are moderately elevated.   Desired or goal levels are:  TOTAL CHOLESTEROL: Desirable is less than 200.   HDL (Good Cholesterol): Desirable is greater than 40 (for men) greater than 50 (for women).  LDL (Bad Cholesterol): Desirable is less than 130 (or less than 100 if you have heart disease or diabetes). Borderline 130-160.  TRIGLYCERIDES: Desirable is less than 150.  Borderline is 150-200..    As you may know, an elevated cholesterol is one factor that increases your risk for heart disease and stroke. You can improve your cholesterol by controlling the amount and type of fat you eat and by increasing your daily activity level.  Here are some ways to improve your nutrition:  Eat less fat (especially butter, Crisco and other saturated fats)  Buy lean cuts of meat, reduce your portions of red meat or substitute poultry or fish  Use skim milk and low-fat dairy products  Eat no more than 4 egg yolks per week  Avoid fried or fast foods that are high in fat  Eat more fruits and vegetables  Also consider starting or increasing your aerobic activity. Aerobic activity is the best way to improve HDL (good) cholesterol.    Let me know if you have any questions. Take care.  Karon Flor MD

## 2024-02-26 LAB
BKR LAB AP GYN ADEQUACY: NORMAL
BKR LAB AP GYN INTERPRETATION: NORMAL
BKR LAB AP HPV REFLEX: NORMAL
BKR LAB AP PREVIOUS ABNORMAL: NORMAL
PATH REPORT.COMMENTS IMP SPEC: NORMAL
PATH REPORT.COMMENTS IMP SPEC: NORMAL
PATH REPORT.RELEVANT HX SPEC: NORMAL

## 2024-02-28 LAB
HUMAN PAPILLOMA VIRUS 16 DNA: NEGATIVE
HUMAN PAPILLOMA VIRUS 18 DNA: NEGATIVE
HUMAN PAPILLOMA VIRUS FINAL DIAGNOSIS: NORMAL
HUMAN PAPILLOMA VIRUS OTHER HR: NEGATIVE

## 2024-03-07 ENCOUNTER — TELEPHONE (OUTPATIENT)
Dept: GASTROENTEROLOGY | Facility: CLINIC | Age: 39
End: 2024-03-07
Payer: COMMERCIAL

## 2024-03-07 NOTE — TELEPHONE ENCOUNTER
"Endoscopy Scheduling Screen    Have you had a positive Covid test in the last 14 days?  No    Are you active on MyChart?   Yes    What insurance is in the chart?  Other:  [facility]    Ordering/Referring Provider: Chacho   (If ordering provider performs procedure, schedule with ordering provider unless otherwise instructed. )    BMI: Estimated body mass index is 30.88 kg/m  as calculated from the following:    Height as of 2/20/24: 1.709 m (5' 7.3\").    Weight as of 2/20/24: 90.2 kg (198 lb 14.4 oz).     Sedation Ordered  moderate sedation.   If patient BMI > 50 do not schedule in [facility].    If patient BMI > 45 do not schedule at [facility].    Are you taking methadone or Suboxone?  No    Have you had difficulties, pain, or discomfort during past endoscopy procedures?  No    Are you taking any prescription medications for pain 3 or more times per week?   NO - No RN review required.    Do you have a history of malignant hyperthermia or adverse reaction to anesthesia?  No    (Females) Are you currently pregnant?   No     Have you been diagnosed or told you have pulmonary hypertension?   No    Do you have an LVAD?  No    Have you been told you have moderate to severe sleep apnea?  No    Have you been told you have COPD, asthma, or any other lung disease?  No    Do you have any heart conditions?  No     Have you ever had an organ transplant?   No    Have you ever had or are you awaiting a heart or lung transplant?   No    Have you had a stroke or transient ischemic attack (TIA aka \"mini stroke\" in the last 6 months?   No    Have you been diagnosed with or been told you have cirrhosis of the liver?   No    Are you currently on dialysis?   No    Do you need assistance transferring?   No    BMI: Estimated body mass index is 30.88 kg/m  as calculated from the following:    Height as of 2/20/24: 1.709 m (5' 7.3\").    Weight as of 2/20/24: 90.2 kg (198 lb 14.4 oz).     Is patients BMI > 40 and scheduling location [facility]?  No    Do you take " an injectable medication for weight loss or diabetes (excluding insulin)?  No    Do you take the medication Naltrexone?  No    Do you take blood thinners?  No       Prep   Are you currently on dialysis or do you have chronic kidney disease?  No    Do you have a diagnosis of diabetes?  No    Do you have a diagnosis of cystic fibrosis (CF)?  No    On a regular basis do you go 3 -5 days between bowel movements?  No    BMI > 40?  No    Preferred Pharmacy:    [facility]pharmacy #[id number] [address] [facility]  [address] [facility] [address]  Phone: [phone] Fax: [phone]      Final Scheduling Details   Colonoscopy prep sent?  N/A    Procedure scheduled  Colonoscopy    Surgeon:  Mel     Date of procedure:  04/30/2024     Pre-OP / PAC:   No - Not required for this site.    Location  [facility] - [facility] - Patient preference.    Sedation   Moderate Sedation - Per order.      Patient Reminders:   You will receive a call from a Nurse to review instructions and health history.  This assessment must be completed prior to your procedure.  Failure to complete the Nurse assessment may result in the procedure being cancelled.      On the day of your procedure, please designate an adult(s) who can drive you home stay with you for the next 24 hours. The medicines used in the exam will make you sleepy. You will not be able to drive.      You cannot take public transportation, ride share services, or non-medical taxi service without a responsible caregiver.  Medical transport services are allowed with the requirement that a responsible caregiver will receive you at your destination.  We require that drivers and caregivers are confirmed prior to your procedure.

## 2024-04-17 ENCOUNTER — TELEPHONE (OUTPATIENT)
Dept: GASTROENTEROLOGY | Facility: CLINIC | Age: 39
End: 2024-04-17
Payer: COMMERCIAL

## 2024-04-17 NOTE — TELEPHONE ENCOUNTER
Pre assessment completed for upcoming procedure.      Procedure details:    Patient scheduled for Colonoscopy  on 4.30.24.     Arrival time: 1245. Procedure time 1330    Facility location: [facility] [place]; [address], [place] [address]. Check in location: 2nd Floor at Surgery desk.    Sedation type: Conscious sedation     Pre op exam needed? N/A    Indication for procedure: screening (family hx)     Designated  policy reviewed. Instructed to have someone stay 6 hours post procedure.       Chart review:     Electronic implanted devices? No    Recent diagnosis of diverticulitis within the last 6 weeks?  No    Diabetic? No      Medication review:    Anticoagulants? No    NSAIDS? No    Other medication HOLDING recommendations:  N/A      Prep for procedure:     Bowel prep recommendation: Standard Miralax  Due to: standard bowel prep.    Prep instructions sent via [employer]     Reviewed procedure prep instructions.     Patient verbalized understanding and had no questions or concerns at this time.        Linda Aguilar RN  Endoscopy Procedure Pre Assessment RN  [phone] option 4

## 2024-04-30 ENCOUNTER — HOSPITAL ENCOUNTER (OUTPATIENT)
Facility: AMBULATORY SURGERY CENTER | Age: 39
Discharge: HOME | End: 2024-04-30
Attending: COLON & RECTAL SURGERY | Admitting: COLON & RECTAL SURGERY
Payer: COMMERCIAL

## 2024-04-30 VITALS
TEMPERATURE: 97.3 F | RESPIRATION RATE: 16 BRPM | SYSTOLIC BLOOD PRESSURE: 117 MMHG | OXYGEN SATURATION: 99 % | DIASTOLIC BLOOD PRESSURE: 82 MMHG | HEART RATE: 72 BPM

## 2024-04-30 LAB — COLONOSCOPY: NORMAL

## 2024-04-30 PROCEDURE — G8907 PT DOC NO EVENTS ON DISCHARG: HCPCS

## 2024-04-30 PROCEDURE — 45378 DIAGNOSTIC COLONOSCOPY: CPT

## 2024-04-30 PROCEDURE — G8918 PT W/O PREOP ORDER IV AB PRO: HCPCS

## 2024-04-30 RX ORDER — NALOXONE HYDROCHLORIDE 0.4 MG/ML
0.2 INJECTION, SOLUTION INTRAMUSCULAR; INTRAVENOUS; SUBCUTANEOUS
Status: CANCELLED | OUTPATIENT
Start: 2024-04-30

## 2024-04-30 RX ORDER — NALOXONE HYDROCHLORIDE 0.4 MG/ML
0.4 INJECTION, SOLUTION INTRAMUSCULAR; INTRAVENOUS; SUBCUTANEOUS
Status: CANCELLED | OUTPATIENT
Start: 2024-04-30

## 2024-04-30 RX ORDER — LIDOCAINE 40 MG/G
CREAM TOPICAL
Status: DISCONTINUED | OUTPATIENT
Start: 2024-04-30 | End: 2024-05-01 | Stop reason: HOSPADM

## 2024-04-30 RX ORDER — SODIUM CHLORIDE, SODIUM LACTATE, POTASSIUM CHLORIDE, CALCIUM CHLORIDE 600; 310; 30; 20 MG/100ML; MG/100ML; MG/100ML; MG/100ML
INJECTION, SOLUTION INTRAVENOUS CONTINUOUS
Status: DISCONTINUED | OUTPATIENT
Start: 2024-04-30 | End: 2024-05-01 | Stop reason: HOSPADM

## 2024-04-30 RX ORDER — FLUMAZENIL 0.1 MG/ML
0.2 INJECTION, SOLUTION INTRAVENOUS
Status: CANCELLED | OUTPATIENT
Start: 2024-04-30 | End: 2024-05-01

## 2024-04-30 RX ORDER — ONDANSETRON 2 MG/ML
4 INJECTION INTRAMUSCULAR; INTRAVENOUS
Status: DISCONTINUED | OUTPATIENT
Start: 2024-04-30 | End: 2024-05-01 | Stop reason: HOSPADM

## 2024-04-30 RX ORDER — ONDANSETRON 4 MG/1
4 TABLET, ORALLY DISINTEGRATING ORAL EVERY 6 HOURS PRN
Status: CANCELLED | OUTPATIENT
Start: 2024-04-30

## 2024-04-30 RX ORDER — ONDANSETRON 2 MG/ML
4 INJECTION INTRAMUSCULAR; INTRAVENOUS EVERY 6 HOURS PRN
Status: CANCELLED | OUTPATIENT
Start: 2024-04-30

## 2024-04-30 RX ORDER — PROCHLORPERAZINE MALEATE 10 MG
10 TABLET ORAL EVERY 6 HOURS PRN
Status: CANCELLED | OUTPATIENT
Start: 2024-04-30

## 2024-04-30 RX ORDER — FENTANYL CITRATE 50 UG/ML
INJECTION, SOLUTION INTRAMUSCULAR; INTRAVENOUS PRN
Status: DISCONTINUED | OUTPATIENT
Start: 2024-04-30 | End: 2024-04-30 | Stop reason: HOSPADM

## 2025-02-21 PROBLEM — M77.11 LATERAL EPICONDYLITIS OF RIGHT ELBOW: Status: RESOLVED | Noted: 2023-02-10 | Resolved: 2025-02-21

## 2025-02-21 PROBLEM — M77.8 TENDONITIS OF WRIST, RIGHT: Status: RESOLVED | Noted: 2023-03-07 | Resolved: 2025-02-21

## 2025-06-16 ENCOUNTER — OFFICE VISIT (OUTPATIENT)
Dept: FAMILY MEDICINE | Facility: CLINIC | Age: 40
End: 2025-06-16
Payer: COMMERCIAL

## 2025-06-16 VITALS
BODY MASS INDEX: 29.02 KG/M2 | HEART RATE: 64 BPM | OXYGEN SATURATION: 100 % | TEMPERATURE: 98.2 F | SYSTOLIC BLOOD PRESSURE: 129 MMHG | HEIGHT: 67 IN | WEIGHT: 184.9 LBS | RESPIRATION RATE: 13 BRPM | DIASTOLIC BLOOD PRESSURE: 85 MMHG

## 2025-06-16 DIAGNOSIS — H65.01 NON-RECURRENT ACUTE SEROUS OTITIS MEDIA OF RIGHT EAR: ICD-10-CM

## 2025-06-16 DIAGNOSIS — H65.91 OME (OTITIS MEDIA WITH EFFUSION), RIGHT: Primary | ICD-10-CM

## 2025-06-16 PROCEDURE — 3079F DIAST BP 80-89 MM HG: CPT | Performed by: FAMILY MEDICINE

## 2025-06-16 PROCEDURE — 3074F SYST BP LT 130 MM HG: CPT | Performed by: FAMILY MEDICINE

## 2025-06-16 PROCEDURE — 99213 OFFICE O/P EST LOW 20 MIN: CPT | Performed by: FAMILY MEDICINE

## 2025-06-16 RX ORDER — AMOXICILLIN 875 MG/1
875 TABLET, COATED ORAL 2 TIMES DAILY
Qty: 20 TABLET | Refills: 0 | Status: SHIPPED | OUTPATIENT
Start: 2025-06-16 | End: 2025-06-26

## 2025-06-16 NOTE — PROGRESS NOTES
Assessment & Plan     OME (otitis media with effusion), right  Effusion with infection noted on exam today.  Begin amoxicillin as given.  In addition a long-acting Sudafed along with antihistamine in the form of Claritin-D was sent to the pharmacy for daily use.  She will follow-up if symptoms fail to resolve or worsen.  - amoxicillin (AMOXIL) 875 MG tablet; Take 1 tablet (875 mg) by mouth 2 times daily for 10 days.  - loratadine-pseudoePHEDrine (CLARITIN-D 24-HOUR)  MG 24 hr tablet; Take 1 tablet by mouth daily.    Non-recurrent acute serous otitis media of right ear  Likely short-term antihistamine decongestant combination to resolve the effusion right ear.  Follow-up if persistent symptoms are noted.  - loratadine-pseudoePHEDrine (CLARITIN-D 24-HOUR)  MG 24 hr tablet; Take 1 tablet by mouth daily.      Patient Instructions   Begin amoxicillin twice a day for 10 days.  This will treat the infection.   For the pain and congestion symptoms, use claritin D daily for 10-14 days.            Follow-up    Follow-up Visit   Expected date:  Jun 23, 2025 (Approximate)      Follow Up Appointment Details:     Follow-up with whom?: My Department    Follow-Up for what?: Acute Issue Recheck    Additional Details: for ongoing ear symptoms    How?: In Person    Is this an as-needed follow-up?: Yes                 Subjective   Beatris is a 39 year old, presenting for the following health issues:  Otalgia (/)        6/16/2025     8:43 AM   Additional Questions   Roomed by Shelia     History of Present Illness       Reason for visit:  Ear clogged and pain  Symptom onset:  1-3 days ago  Symptoms include:  Runny nose and sneezing  Symptom intensity:  Moderate  Symptom progression:  Staying the same  Had these symptoms before:  No   She is taking medications regularly.      2 days ago with runny nose and sneezing.  Yesterday with more discomfort.  Last PM with pain in the ear - popping and plugged. No fever.  Muffled hearing.  "     Tried ibuprofen and sudafed at 5 am with some benefit.          Review of Systems  Constitutional, HEENT, cardiovascular, pulmonary, gi and gu systems are negative, except as otherwise noted.      Objective    /85 (BP Location: Right arm, Patient Position: Sitting, Cuff Size: Adult Regular)   Pulse 64   Temp 98.2  F (36.8  C) (Oral)   Resp 13   Ht 1.702 m (5' 7\")   Wt 83.9 kg (184 lb 14.4 oz)   SpO2 100%   BMI 28.96 kg/m    Body mass index is 28.96 kg/m .  Physical Exam   GENERAL: alert and no distress  EYES: Eyes grossly normal to inspection, PERRL and conjunctivae and sclerae normal  HENT: normal cephalic/atraumatic, right ear: clear effusion and erythematous, left ear: normal: no effusions, no erythema, normal landmarks, nose and mouth without ulcers or lesions, nasal mucosa edematous , rhinorrhea clear, oropharynx clear, and oral mucous membranes moist  NECK: no adenopathy, no asymmetry, masses, or scars  RESP: lungs clear to auscultation - no rales, rhonchi or wheezes  CV: regular rate and rhythm, normal S1 S2, no S3 or S4, no murmur, click or rub, no peripheral edema  MS: no gross musculoskeletal defects noted, no edema            Signed Electronically by: Sarah Kelly MD        This chart was documented by provider using a voice activated software called Dragon in addition to manual typing. There may be vocabulary errors or other grammatical errors due to this.       "

## 2025-06-16 NOTE — PATIENT INSTRUCTIONS
Begin amoxicillin twice a day for 10 days.  This will treat the infection.   For the pain and congestion symptoms, use claritin D daily for 10-14 days.

## (undated) DEVICE — PAD CHUX UNDERPAD 23X24" 7136

## (undated) DEVICE — PREP CHLORAPREP 26ML TINTED ORANGE  260815

## (undated) DEVICE — KIT ENDO FIRST STEP DISINFECTANT 200ML W/POUCH EP-4

## (undated) RX ORDER — FENTANYL CITRATE 50 UG/ML
INJECTION, SOLUTION INTRAMUSCULAR; INTRAVENOUS
Status: DISPENSED
Start: 2024-04-30